# Patient Record
Sex: FEMALE | Race: WHITE | NOT HISPANIC OR LATINO | Employment: UNEMPLOYED | URBAN - METROPOLITAN AREA
[De-identification: names, ages, dates, MRNs, and addresses within clinical notes are randomized per-mention and may not be internally consistent; named-entity substitution may affect disease eponyms.]

---

## 2021-03-22 ENCOUNTER — TELEPHONE (OUTPATIENT)
Dept: FAMILY MEDICINE CLINIC | Facility: CLINIC | Age: 21
End: 2021-03-22

## 2021-03-22 ENCOUNTER — OFFICE VISIT (OUTPATIENT)
Dept: FAMILY MEDICINE CLINIC | Facility: CLINIC | Age: 21
End: 2021-03-22

## 2021-03-22 VITALS
TEMPERATURE: 98.6 F | OXYGEN SATURATION: 98 % | HEIGHT: 70 IN | WEIGHT: 123 LBS | DIASTOLIC BLOOD PRESSURE: 60 MMHG | HEART RATE: 78 BPM | RESPIRATION RATE: 16 BRPM | BODY MASS INDEX: 17.61 KG/M2 | SYSTOLIC BLOOD PRESSURE: 106 MMHG

## 2021-03-22 DIAGNOSIS — F41.9 ANXIETY: ICD-10-CM

## 2021-03-22 DIAGNOSIS — F32.9 MAJOR DEPRESSIVE DISORDER WITH CURRENT ACTIVE EPISODE, UNSPECIFIED DEPRESSION EPISODE SEVERITY, UNSPECIFIED WHETHER RECURRENT: ICD-10-CM

## 2021-03-22 DIAGNOSIS — Z76.89 ENCOUNTER TO ESTABLISH CARE: Primary | ICD-10-CM

## 2021-03-22 DIAGNOSIS — J45.20 MILD INTERMITTENT ASTHMA WITHOUT COMPLICATION: ICD-10-CM

## 2021-03-22 PROCEDURE — 99203 OFFICE O/P NEW LOW 30 MIN: CPT | Performed by: NURSE PRACTITIONER

## 2021-03-22 RX ORDER — ALBUTEROL SULFATE 2.5 MG/3ML
2.5 SOLUTION RESPIRATORY (INHALATION) EVERY 4 HOURS PRN
Qty: 100 EACH | Refills: 0 | Status: SHIPPED | OUTPATIENT
Start: 2021-03-22

## 2021-03-22 RX ORDER — ALBUTEROL SULFATE 90 UG/1
2 AEROSOL, METERED RESPIRATORY (INHALATION) EVERY 4 HOURS PRN
Qty: 18 G | Refills: 2 | Status: SHIPPED | OUTPATIENT
Start: 2021-03-22 | End: 2021-07-15 | Stop reason: SDUPTHER
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
## 2021-03-22 NOTE — TELEPHONE ENCOUNTER
Caller: Muna Hernandez    Relationship to patient: Self    Best call back number: [phone]    Patient is needing:       PATIENT FAILED COVID SCREENING AND HAS A 9:30 NEW PATIENT APPOINTMENT WITH REE CASTRO THIS MORNING.    PATIENT WAS EXPOSED TO COVID THROUGH A COWORKER LAST Monday AND GOT COVID TESTED A DAY OR TWO LATER- HER RESULTS WERE NEGATIVE, AND THE ONLY COVID SYMPTOM SHE HAS RIGHT NOW IS A COUGH, DUE TO ALLERGIES.

## 2021-03-31 ENCOUNTER — TELEPHONE (OUTPATIENT)
Dept: FAMILY MEDICINE CLINIC | Facility: CLINIC | Age: 21
End: 2021-03-31

## 2021-03-31 NOTE — TELEPHONE ENCOUNTER
Patient was told we do not remove implanted birth control at this office. I recommended she call an OB-GYN.

## 2021-03-31 NOTE — TELEPHONE ENCOUNTER
Caller: Delphine Hernandez    Relationship: Self    Best call back number:     What is the best time to reach you: 1PM-2PM OR AFTER 5:30PM    Who are you requesting to speak with (clinical staff, provider,  specific staff member):     Do you know the name of the person who called:     What was the call regarding: PT IS CALLING IN TO SEE IF THE OFFICE WILL BE ABLE TO REMOVE HER IMPLANTED BIRTH CONTROL DEVICE FROM HER ARM OR DOES SHE NEED TO FIND ANOTHER FACILITY TO DO THIS.      Do you require a callback: YES

## 2021-05-04 ENCOUNTER — OFFICE VISIT (OUTPATIENT)
Dept: FAMILY MEDICINE CLINIC | Facility: CLINIC | Age: 21
End: 2021-05-04

## 2021-05-04 VITALS
TEMPERATURE: 98.4 F | HEART RATE: 94 BPM | BODY MASS INDEX: 20.19 KG/M2 | WEIGHT: 141 LBS | OXYGEN SATURATION: 98 % | HEIGHT: 70 IN | RESPIRATION RATE: 16 BRPM | SYSTOLIC BLOOD PRESSURE: 120 MMHG | DIASTOLIC BLOOD PRESSURE: 60 MMHG

## 2021-05-04 DIAGNOSIS — H10.12 ALLERGIC CONJUNCTIVITIS OF LEFT EYE: Primary | ICD-10-CM

## 2021-05-04 DIAGNOSIS — F32.9 MAJOR DEPRESSIVE DISORDER WITH CURRENT ACTIVE EPISODE, UNSPECIFIED DEPRESSION EPISODE SEVERITY, UNSPECIFIED WHETHER RECURRENT: ICD-10-CM

## 2021-05-04 PROCEDURE — 99213 OFFICE O/P EST LOW 20 MIN: CPT | Performed by: NURSE PRACTITIONER

## 2021-05-04 RX ORDER — VILAZODONE HYDROCHLORIDE 10 MG/1
TABLET ORAL
COMMUNITY
Start: 2021-03-23 | End: 2021-05-04 | Stop reason: ALTCHOICE

## 2021-05-04 RX ORDER — OLOPATADINE HYDROCHLORIDE 1 MG/ML
1 SOLUTION/ DROPS OPHTHALMIC 2 TIMES DAILY
COMMUNITY
Start: 2021-05-04 | End: 2022-09-19

## 2021-05-04 NOTE — PROGRESS NOTES
Patient ID: Delphine Hernandez is a 21 y.o. female     Patient Care Team:  Head, SHAREE Nieves as PCP - General (Nurse Practitioner)    Subjective     Chief Complaint   Patient presents with   • Conjunctivitis     left   • Depression       Delphine Hernandez presents to [facility] today for follow-up on depression and left eye redness.      Depression  Visit Type: follow-up  Patient presents with the following symptoms: depressed mood, excessive worry, fatigue, insomnia (anxiety when trying to sleep), nausea and nervousness/anxiety.  Patient is not experiencing: chest pain, feelings of hopelessness, palpitations, shortness of breath, suicidal ideas and suicidal planning.  Frequency of symptoms: constantly   Severity: mild   Sleep per night: 6 hours  Sleep quality: fair  Nighttime awakenings: several  Side effects:  Insomnia and dry mouth  Still undergoing counseling.  Was started on Viibryd about 3 weeks ago per [facility] [employer].  Does not feel medication is helping. Also medication is affecting sleep and causing nausea.  Has to take at night due to unable to remember to take medication in the morning. Tried Remeron X 2 doses and did not like the way it made her feel.      Left eye redness started yesterday.  States has slightly improved today.  Eye itches and tearing.  No drainage noted.  Does wear contacts however stopped wearing yesterday.  Works at .      She denies any complaints of fever, chills, cough, chest pain, shortness of air, abdominal pain, or any other concerns.     The following portions of the patient's history were reviewed and updated as appropriate: allergies, current medications, past family history, past medical history, past social history, past surgical history and problem list.       Review of Systems   Cardiovascular: Negative for palpitations.   Respiratory: Negative for shortness of breath.    Psychiatric/Behavioral: Negative for suicidal ideas. The patient has  insomnia (anxiety when trying to sleep) and is nervous/anxious.        Vitals:    05/04/21 0741   BP: 120/60   Pulse: 94   Resp: 16   Temp: 98.4 °F (36.9 °C)   SpO2: 98%       Documented weights    05/04/21 0741   Weight: 64 kg (141 lb)     Body mass index is 20.23 kg/m².    No results found for this or any previous visit.        Objective     Physical Exam  Vitals reviewed.   Constitutional:       General: She is not in acute distress.  Eyes:      General:         Right eye: No discharge.         Left eye: No discharge.      Conjunctiva/sclera:      Right eye: Right conjunctiva is not injected. No exudate.     Left eye: Left conjunctiva is injected. No exudate.     Comments: Tearing from left eye.  Slight swelling of left upper eyelid.     Neurological:      Mental Status: She is alert.            Assessment/Plan     Assessment/Plan     Diagnoses and all orders for this visit:    1. Allergic conjunctivitis of left eye (Primary)  -     olopatadine (Pataday) 0.1 % ophthalmic solution; Administer 1 drop into the left eye 2 (Two) Times a Day.    2. Major depressive disorder with current active episode, unspecified depression episode severity, unspecified whether recurrent  -     sertraline (Zoloft) 50 MG tablet; Take 1 tablet by mouth Daily.  Dispense: 30 tablet; Refill: 2      Summary:  Delphine Hernandez does not feel Viibryd is helping control her depression.  She has been taking it for 3 weeks now.  Instructed we will try her on different medication.  She has taken a couple of other different ones in the past however it was when she was younger.  She is unsure of the names of the medications she tried in the past.  Instructed to stop Viibryd.  We will start her on sertraline 50 mg daily.  We will have her follow-up in approximately 4 weeks to see how she is doing.  Continue with counseling.  Also the left thigh appears to be related to allergic conjunctivitis.  Instructed to use over-the-counter Pataday eyedrops 1 drop  to left eye twice a day.  Avoid using her contacts over the next week.  Cool compresses to left eye can help.  She is to let us know if eye redness does not improve with these measures in the next 2 to 3 days.  In the meantime, instructed to contact us sooner for any problems or concerns.    Follow Up:  Return in about 4 weeks (around 6/1/2021) for Recheck on depression.    Patient was given instructions and counseling regarding condition or for health maintenance advice.  Please see specific information pulled into the AVS if appropriate.      Patient was wearing facemask when I entered the room and throughout our encounter. Protective equipment was worn throughout this patient encounter including a face mask, eye protection,  and gloves. Hand hygiene was performed before donning protective equipment and after removal when leaving the room.     Mackenzie Ndiaye, SHAREE  Family Medicine  [facility]  05/04/21  08:38 EDT

## 2021-05-18 ENCOUNTER — OFFICE VISIT (OUTPATIENT)
Dept: FAMILY MEDICINE CLINIC | Facility: CLINIC | Age: 21
End: 2021-05-18

## 2021-05-18 VITALS
HEART RATE: 98 BPM | SYSTOLIC BLOOD PRESSURE: 118 MMHG | OXYGEN SATURATION: 98 % | BODY MASS INDEX: 20.33 KG/M2 | DIASTOLIC BLOOD PRESSURE: 70 MMHG | HEIGHT: 70 IN | WEIGHT: 142 LBS | TEMPERATURE: 98.2 F

## 2021-05-18 DIAGNOSIS — H10.33 ACUTE CONJUNCTIVITIS OF BOTH EYES, UNSPECIFIED ACUTE CONJUNCTIVITIS TYPE: Primary | ICD-10-CM

## 2021-05-18 DIAGNOSIS — J45.20 MILD INTERMITTENT ASTHMA WITHOUT COMPLICATION: ICD-10-CM

## 2021-05-18 DIAGNOSIS — J30.9 ALLERGIC RHINITIS, UNSPECIFIED SEASONALITY, UNSPECIFIED TRIGGER: ICD-10-CM

## 2021-05-18 PROCEDURE — 99213 OFFICE O/P EST LOW 20 MIN: CPT | Performed by: NURSE PRACTITIONER

## 2021-05-18 RX ORDER — MONTELUKAST SODIUM 10 MG/1
10 TABLET ORAL NIGHTLY
Qty: 30 TABLET | Refills: 5 | Status: SHIPPED | OUTPATIENT
Start: 2021-05-18 | End: 2022-09-19

## 2021-05-18 RX ORDER — POLYMYXIN B SULFATE AND TRIMETHOPRIM 1; 10000 MG/ML; [USP'U]/ML
1 SOLUTION OPHTHALMIC EVERY 4 HOURS
Qty: 10 ML | Refills: 0 | Status: SHIPPED | OUTPATIENT
Start: 2021-05-18 | End: 2022-03-09 | Stop reason: SDUPTHER

## 2021-05-18 NOTE — PROGRESS NOTES
"Subjective      Chief Complaint   Patient presents with   • Eye Problem     eyes burning, light sensitive, itching only if flushes x 1 week   • Headache       Delphine Hernandez is a 21 y.o. female who presents for evaluation of discharge, erythema, itching, photophobia and tearing in both eyes. She has noticed the above symptoms for 3 weeks. Onset was gradual. Patient denies blurred vision. There is a history of contact lens use and wearing glasses.  Has a headache which she feels is related to eye pain and sinus pressure.  Had used leftover eye drops of Azelastine eye drops without improvement.  Has history of allergies and asthma.  Worsening allergies since moving to [place] from [place].  Has been taking Claritin every am and albuterol nebulizer at night.        Also had changed antidepressant from Viibryd to sertraline when last seen in office.  Has noticed improvement in depression.  Does seem to affect sleep as much as Viibryd.      The following portions of the patient's history were reviewed and updated as appropriate: allergies, current medications, past family history, past medical history, past social history, past surgical history and problem list.      Objective   /70 (BP Location: Left arm, Patient Position: Sitting, Cuff Size: Adult)   Pulse 98   Temp 98.2 °F (36.8 °C) (Temporal)   Ht 177.8 cm (70\")   Wt 64.4 kg (142 lb)   SpO2 98%   BMI 20.37 kg/m²        General: alert, appears stated age, cooperative and mild distress.  Head:  Frontal sinus tenderness.     Eyes:  positive findings: eyelids/periorbital: eyelid swelling, conjunctiva: 2+ allergic conjunctivitis.  Immediate tearing of both eyes with checking pupils with light.     Vision: Not performed   Fluorescein:  not done   Lungs:  CTA bilaterally.  Respirations even and unlabored.  Cardiac:  RRR, no murmur.      Assessment/Plan   Allergic conjunctivitis and Allergic rhinitis       Diagnosis Plan   1. Acute conjunctivitis of both eyes, unspecified " acute conjunctivitis type  trimethoprim-polymyxin b (Polytrim) 78651-2.1 UNIT/ML-% ophthalmic solution   2. Mild intermittent asthma without complication  montelukast (Singulair) 10 MG tablet   3. Allergic rhinitis, unspecified seasonality, unspecified trigger  montelukast (Singulair) 10 MG tablet       Ophthalmic drops per orders.  Do not wear contacts for at least one week and complete resolution of symptoms.    Antihistamines per orders.  Warm compress to eye(s).  FU with PCP in 1 week if no improvement or PRN.      Patient was wearing face mask when I entered the room and throughout our encounter. Protective equipment was worn throughout this patient encounter including a face mask, eye protection, and gloves. Hand hygiene was performed before donning protective equipment and after removal when leaving the room.     Mackenzie Ndiaye, APRN

## 2021-07-15 ENCOUNTER — OFFICE VISIT (OUTPATIENT)
Dept: OBSTETRICS AND GYNECOLOGY | Facility: CLINIC | Age: 21
End: 2021-07-15

## 2021-07-15 VITALS
HEIGHT: 70 IN | SYSTOLIC BLOOD PRESSURE: 110 MMHG | WEIGHT: 143 LBS | BODY MASS INDEX: 20.47 KG/M2 | DIASTOLIC BLOOD PRESSURE: 72 MMHG

## 2021-07-15 DIAGNOSIS — Z01.419 PAP SMEAR, LOW-RISK: ICD-10-CM

## 2021-07-15 DIAGNOSIS — Z30.46 NEXPLANON REMOVAL: ICD-10-CM

## 2021-07-15 DIAGNOSIS — Z11.51 SPECIAL SCREENING EXAMINATION FOR HUMAN PAPILLOMAVIRUS (HPV): ICD-10-CM

## 2021-07-15 DIAGNOSIS — Z01.419 ROUTINE GYNECOLOGICAL EXAMINATION: Primary | ICD-10-CM

## 2021-07-15 DIAGNOSIS — Z30.430 ENCOUNTER FOR IUD INSERTION: ICD-10-CM

## 2021-07-15 LAB
B-HCG UR QL: NEGATIVE
BILIRUB BLD-MCNC: NEGATIVE MG/DL
CLARITY, POC: CLEAR
COLOR UR: YELLOW
GLUCOSE UR STRIP-MCNC: NEGATIVE MG/DL
INTERNAL NEGATIVE CONTROL: NORMAL
INTERNAL POSITIVE CONTROL: NORMAL
KETONES UR QL: NEGATIVE
LEUKOCYTE EST, POC: NEGATIVE
Lab: NORMAL
NITRITE UR-MCNC: NEGATIVE MG/ML
PH UR: 5 [PH] (ref 5–8)
PROT UR STRIP-MCNC: NEGATIVE MG/DL
RBC # UR STRIP: NEGATIVE /UL
SP GR UR: 1 (ref 1–1.03)
UROBILINOGEN UR QL: NORMAL

## 2021-07-15 PROCEDURE — 81025 URINE PREGNANCY TEST: CPT | Performed by: OBSTETRICS & GYNECOLOGY

## 2021-07-15 PROCEDURE — 58300 INSERT INTRAUTERINE DEVICE: CPT | Performed by: OBSTETRICS & GYNECOLOGY

## 2021-07-15 PROCEDURE — 11982 REMOVE DRUG IMPLANT DEVICE: CPT | Performed by: OBSTETRICS & GYNECOLOGY

## 2021-07-15 PROCEDURE — 81002 URINALYSIS NONAUTO W/O SCOPE: CPT | Performed by: OBSTETRICS & GYNECOLOGY

## 2021-07-15 PROCEDURE — 99385 PREV VISIT NEW AGE 18-39: CPT | Performed by: OBSTETRICS & GYNECOLOGY

## 2021-07-15 RX ORDER — KETOTIFEN FUMARATE 0.35 MG/ML
SOLUTION/ DROPS OPHTHALMIC
COMMUNITY
End: 2022-09-19

## 2021-07-15 RX ORDER — LORATADINE 10 MG/1
10 TABLET ORAL DAILY
COMMUNITY

## 2021-07-15 NOTE — PROGRESS NOTES
GYN Annual Exam     CC- Here for annual exam.     Delphine Hernandez is a 21 y.o. female who presents for annual well woman exam. Pt is a new pt to me. Pt has a Nexplanon in place and she gets irregular cycles. She reports her Nexplanon in expiring in 11/2022. Pt desires removal and placement of the Mirena IUD to help with cycle control. Pt reports she has a hx of severe dysmenorrhea and the Nexplanon has improved her symptoms.     OB History    No obstetric history on file.         Current contraception: Nexplanon  History of abnormal Pap smear: no  History of abnormal mammogram: no  Family history of uterine, colon or ovarian cancer: no  Family history of breast cancer: no    Health Maintenance   Topic Date Due   • Annual Gynecologic Pelvic and Breast Exam  Never done   • ANNUAL PHYSICAL  Never done   • Pneumococcal Vaccine 0-64 (1 of 1 - PPSV23) Never done   • HPV VACCINES (1 - 2-dose series) Never done   • COVID-19 Vaccine (1) Never done   • TDAP/TD VACCINES (1 - Tdap) Never done   • HEPATITIS C SCREENING  Never done   • CHLAMYDIA SCREENING  Never done   • PAP SMEAR  Never done   • INFLUENZA VACCINE  08/01/2021   • MENINGOCOCCAL VACCINE  Aged Out       Past Medical History:   Diagnosis Date   • Anxiety    • Asthma    • Depression        No past surgical history on file.      Current Outpatient Medications:   •  albuterol (PROVENTIL) (2.5 MG/3ML) 0.083% nebulizer solution, Take 2.5 mg by nebulization Every 4 (Four) Hours As Needed for Wheezing or Shortness of Air., Disp: 100 each, Rfl: 0  •  Etonogestrel (NEXPLANON) 68 MG implant subdermal implant, Inject 1 each into the appropriate area of the skin as directed by provider 1 (One) Time., Disp: , Rfl:   •  ketotifen (ZADITOR) 0.025 % ophthalmic solution, Apply  to eye(s) as directed by provider., Disp: , Rfl:   •  loratadine (CLARITIN) 10 MG tablet, Take 10 mg by mouth Daily., Disp: , Rfl:   •  montelukast (Singulair) 10 MG tablet, Take 1 tablet by mouth Every Night.,  "Disp: 30 tablet, Rfl: 5  •  olopatadine (Pataday) 0.1 % ophthalmic solution, Administer 1 drop into the left eye 2 (Two) Times a Day., Disp: , Rfl:   •  sertraline (Zoloft) 50 MG tablet, Take 1 tablet by mouth Daily., Disp: 30 tablet, Rfl: 2  •  trimethoprim-polymyxin b (Polytrim) 49302-6.1 UNIT/ML-% ophthalmic solution, Administer 1 drop to both eyes Every 4 (Four) Hours., Disp: 10 mL, Rfl: 0    Allergies   Allergen Reactions   • Pepto-Bismol [Bismuth] Hives       Social History     Tobacco Use   • Smoking status: Never Smoker   • Smokeless tobacco: Never Used   Vaping Use   • Vaping Use: Never used   Substance Use Topics   • Alcohol use: Yes   • Drug use: Never       Family History   Problem Relation Age of Onset   • Asthma Mother    • Depression Mother    • Anxiety disorder Mother    • Asthma Maternal Grandmother    • Depression Maternal Grandmother    • Arthritis Maternal Grandmother        Review of Systems   Constitutional: Negative for appetite change, chills, fatigue, fever and unexpected weight change.   Gastrointestinal: Negative for abdominal distention, abdominal pain, anal bleeding, blood in stool, constipation, diarrhea, nausea and vomiting.   Genitourinary: Negative for dyspareunia, dysuria, menstrual problem, pelvic pain, vaginal bleeding, vaginal discharge and vaginal pain.       /72   Ht 177.8 cm (70\")   Wt 64.9 kg (143 lb)   BMI 20.52 kg/m²     Physical Exam  Vitals reviewed.   Constitutional:       Appearance: She is well-developed.   HENT:      Mouth/Throat:      Dentition: Normal dentition. No dental caries.   Cardiovascular:      Rate and Rhythm: Normal rate and regular rhythm.      Heart sounds: Normal heart sounds.   Pulmonary:      Effort: Pulmonary effort is normal. No respiratory distress.      Breath sounds: Normal breath sounds. No stridor. No wheezing.   Chest:      Breasts:         Right: No inverted nipple, mass, nipple discharge, skin change or tenderness.         Left: No " inverted nipple, mass, nipple discharge, skin change or tenderness.   Abdominal:      General: There is no distension.      Palpations: Abdomen is soft. There is no mass.      Tenderness: There is no abdominal tenderness.   Genitourinary:     Labia:         Right: No rash, tenderness or lesion.         Left: No rash, tenderness or lesion.       Vagina: No vaginal discharge, tenderness or bleeding.      Cervix: No cervical motion tenderness, discharge or friability.      Uterus: Not deviated, not enlarged, not fixed and not tender.       Adnexa:         Right: No mass, tenderness or fullness.          Left: No mass, tenderness or fullness.     Musculoskeletal:         General: No tenderness. Normal range of motion.   Skin:     General: Skin is warm.      Findings: No erythema or rash.   Neurological:      Mental Status: She is alert and oriented to person, place, and time.      Cranial Nerves: No cranial nerve deficit.      Coordination: Coordination normal.   Psychiatric:         Behavior: Behavior normal.         Thought Content: Thought content normal.         Judgment: Judgment normal.            Assessment/Plan    1) GYN HM: Check pap smear. SBE demonstrated and encouraged.  2) STD screening: Check GCCT  3) Contraception: Pt has a Nexplanon in place that she desires to be removed today. She desires Mirena IUD placement and device was placed today. See procedure notes. Pt tolerated procedures well.  4) Family Planning: not interested in any children at this time  5) Diet and Exercise discussed  6) Smoking Status: nonsmoker  7) Social: moved here from [facility] in 2/2021  8) Hx of severe dysmenorrrhea: Pt reports improved symptoms with nexplanon.   8) Follow up prn and 1 year       Diagnoses and all orders for this visit:    Pap smear, low-risk  -     IGP,CtNgTv,rfx Aptima HPV ASCU    Routine gynecological examination  -     POC Urinalysis Dipstick  -     POC Pregnancy, Urine  -     IGP,CtNgTv,rfx Aptima HPV  ASCU    Special screening examination for human papillomavirus (HPV)  -     IGP,CtNgTv,rfx Aptima HPV ASCU    Other orders  -     loratadine (CLARITIN) 10 MG tablet; Take 10 mg by mouth Daily.  -     ketotifen (ZADITOR) 0.025 % ophthalmic solution; Apply  to eye(s) as directed by provider.          Naomi Moses,   7/15/2021  08:40 EDT

## 2021-07-15 NOTE — PROGRESS NOTES
Procedure: Intrauterine device insertion    Chief Complaint: IUD insertion    Pre procedure indication 1) Desires Mirena  Post procedure indication 1) Desires Mirena    The risks, benefits, and alternatives to IUD were explained at length with the patient. All her questions were answered and consents were signed.    The patient was placed in a dorsal lithotomy position on the examining table in [facility]. A bimanual exam confirmed the uterus was normal in size, anteverted. A warmed metal speculum was inserted into the vagina and the cervix was brought into view.    The cervix was prepped with Betadine. The anterior lip was grasped with a single-tooth tenaculum. The endometrial cavity was then sounded to 8 cm without use of a dilator. The IUD was removed in a sterile fashion.    The  was then carefully advanced to the cervical canal into the uterus to the level of the fundus. This was then backed off about 1.5-2 cm to allow sufficient space for the arms to open. The device was deployed. The  was removed carefully from the uterus. The threads were then cut leaving 2-3 cm visible outside of the cervix.  The single-tooth tenaculum was removed from the anterior lip. Good hemostasis was noted.     All other instruments were removed from the vagina.   There were no complications.  The patient tolerated the procedure well with a minimal amount of discomfort.    The patient was counseled about the need to return in 4 weeks for string check.     She was counseled about the need to use a backup method of contraception such as condoms for 1-2 weeks. The patient is counseled to contact us if she has any significant or increasing bleeding, pain, fever, chills, or other concerns. She is instructed to see a doctor right away if she believes that she may be pregnant at any time with the IUD in place.    Naomi Moses DO    7/15/2021  09:30 EDT

## 2021-07-15 NOTE — PROGRESS NOTES
Procedure: Nexplanon removal    Chief Complaint: Nexplanon removal    Procedures      Pre Dx: 1) Nexplanon removal  Post Dx: 1) Nexplanon removal     The risks, benefits, and alternatives to remove the device have been discussed with the patient at length. All of her questions are answered. She is aware of the need for alternative means of contraception if desired. Verbal informed consent is obtained.    Able to easily palpate the device in the nondominant left arm. Additional imaging was not required. The device feels freely mobile and easily accessible. She was placed in the  supine position with her arm elevated at her side. The skin over this site is prepped with Betadine. 2-3 mL of 1% lidocaine with no epinephrine was injected.  Downward pressure was applied on the end of the implant nearest the axilla, and a 2-3 mm incision was made in a longitudinal direction of the arm at the tip of the implant closest to the elbow. The implant was then pushed gently toward the incision until the tip was visible. The fibrous capsule was opened with blunt dissection using a hemostat. The implant was grasp with a hemostat and was easily removed intact. It measured a  full 4 cm in length.    Tolerated well  No apparent complications    The skin was cleansed and dried. A Steri-Strip was applied. The arm was gently wrapped with Kerlex gauze. The patient had normal strength and sensation in her upper extremity. There was no significant bleeding. There were no complications. The patient was advised about proper care of the dressings. She was advised to call or return for complications such as fever, signs of infection, increased pain, or any other concerns.    Warning signs, limitations and expectations reviewed.     Naomi Moses DO    7/15/2021  09:30 EDT

## 2021-07-19 LAB
C TRACH RRNA CVX QL NAA+PROBE: NEGATIVE
CONV .: NORMAL
CYTOLOGIST CVX/VAG CYTO: NORMAL
CYTOLOGY CVX/VAG DOC CYTO: NORMAL
CYTOLOGY CVX/VAG DOC THIN PREP: NORMAL
DX ICD CODE: NORMAL
HIV 1 & 2 AB SER-IMP: NORMAL
N GONORRHOEA RRNA CVX QL NAA+PROBE: NEGATIVE
OTHER STN SPEC: NORMAL
STAT OF ADQ CVX/VAG CYTO-IMP: NORMAL
T VAGINALIS RRNA SPEC QL NAA+PROBE: NEGATIVE

## 2022-01-04 ENCOUNTER — OFFICE VISIT (OUTPATIENT)
Dept: FAMILY MEDICINE CLINIC | Facility: CLINIC | Age: 22
End: 2022-01-04

## 2022-01-04 VITALS — HEART RATE: 95 BPM | OXYGEN SATURATION: 100 % | TEMPERATURE: 98.6 F | BODY MASS INDEX: 20.52 KG/M2 | HEIGHT: 70 IN

## 2022-01-04 DIAGNOSIS — Z11.52 ENCOUNTER FOR SCREENING FOR COVID-19: ICD-10-CM

## 2022-01-04 DIAGNOSIS — J45.20 MILD INTERMITTENT ASTHMA WITHOUT COMPLICATION: Primary | ICD-10-CM

## 2022-01-04 DIAGNOSIS — J06.9 ACUTE URI: ICD-10-CM

## 2022-01-04 PROCEDURE — 99213 OFFICE O/P EST LOW 20 MIN: CPT | Performed by: NURSE PRACTITIONER

## 2022-01-04 RX ORDER — ALBUTEROL SULFATE 90 UG/1
2 AEROSOL, METERED RESPIRATORY (INHALATION) EVERY 4 HOURS PRN
COMMUNITY
End: 2023-03-28 | Stop reason: SDUPTHER

## 2022-01-04 NOTE — PROGRESS NOTES
"Chief Complaint   Patient presents with   • Headache   • Cough     asthma attack last tues and wed   • Exposure To Known Illness       Upper Respiratory Infection: Patient complains of symptoms of a URI. Symptoms include congestion, cough and SOA, wheezing. Onset of symptoms was 1 week ago, gradually worsening since that time. She also c/o achiness, congestion, no  fever, productive cough with  clear colored sputum, shortness of breath and wheezing for the past 1 week .  She is drinking moderate amounts of fluids. Evaluation to date: none. Treatment to date: cough suppressants and albuterol nebulizer. Last breathing treatment yesterday.  Exposed to co-worker who tested positive yesterday.    Ill contacts at home or school or work discussed.  Unvaccinated against Covid. Hx of asthma.  Works at .    The following portions of the patient's history were reviewed and updated as appropriate: allergies, current medications, past family history, past medical history, past social history, past surgical history and problem list.    Vitals:    01/04/22 0910   Pulse: 95   Temp: 98.6 °F (37 °C)   SpO2: 100%   Height: 177.8 cm (70\")     Gen: Mildly ill appearing, alert, coughing in exam room  Ears: TM's normal without redness  Nose:  Congestion  Throat:  Red without exudate, some drainage  Neck: No LAD  Lung: Good air movement, regular RR  Heart: RR without murmur  Skin: No rash      Assessment/Plan   Diagnoses and all orders for this visit:    1. Mild intermittent asthma without complication (Primary)   Continue albuterol as directed    2. Encounter for screening for COVID-19  -     COVID-19,[facility] ROUTINE, NP/OP SWAB IN TRANSPORT MEDIA OR ESWAB 72 HR TAT - Swab, Nasopharynx    3. Acute URI   Continue OTC Tylenol Cold and Sinus         Patient Instructions   COVID-19: Quarantine vs. Isolation  QUARANTINE keeps someone who was in close contact with someone who has COVID-19 away from others.  If you had close contact with " "a person who has COVID-19  · The best way to protect yourself and others is to stay home for 14 days after your last contact. Check your local health department's website for information about options in your area to possibly shorten this quarantine period.  · Check your temperature twice a day and watch for symptoms of COVID-19.  · If possible, stay away from people who are at higher-risk for getting very sick from COVID-19.  ISOLATION keeps someone who is sick or tested positive for COVID-19 without symptoms away from others, even in their own home.  If you are sick and think or know you have COVID-19  · Stay home until after  ? At least 10 days since symptoms first appeared and  ? At least 24 hours with no fever without fever-reducing medication and  ? Symptoms have improved  If you tested positive for COVID-19 but do not have symptoms  · Stay home until after  ? 10 days have passed since your positive test  If you live with others, stay in a specific \"sick room\" or area and away from other people or animals, including pets. Use a separate bathroom, if available.  cdc.gov/coronavirus  12/17/2020  This information is not intended to replace advice given to you by your health care provider. Make sure you discuss any questions you have with your health care provider.  Document Revised: 07/13/2021 Document Reviewed: 07/13/2021  [email or web address] Patient Education © 2021 [employer]        Tylenol or Advil as needed for pain, fever, muscle aches  Plenty of fluids  Hand washing discussed  Off work or school note given if needed.  Warm tea for throat.  Pros and cons of antibiotic use discussed.  Instructed to notify us if symptoms worsen or do not improve.        Patient was wearing face mask when I entered the room and throughout our encounter. Protective equipment was worn throughout this patient encounter including a face mask, eye protection, and gloves. Hand hygiene was performed before donning protective equipment and " after removal when leaving the room.     Mackenzie Ndiaye, APRN  Family Practice  [facility] [place]

## 2022-01-05 ENCOUNTER — TELEPHONE (OUTPATIENT)
Dept: FAMILY MEDICINE CLINIC | Facility: CLINIC | Age: 22
End: 2022-01-05

## 2022-01-05 NOTE — TELEPHONE ENCOUNTER
Caller: Delphine Hernandez    Relationship: Self    Best call back number: [phone]    What test was performed: COVID    When was the test performed: 1/5/22    Where was the test performed: IN OFFICE    Additional notes: PLEASE CALL PATIENT TO REVIEW TEST RESULTS WHEN THEY ARE IN

## 2022-01-06 LAB
LABCORP SARS-COV-2, NAA 2 DAY TAT: NORMAL
SARS-COV-2 RNA RESP QL NAA+PROBE: DETECTED

## 2022-03-09 ENCOUNTER — APPOINTMENT (OUTPATIENT)
Dept: CT IMAGING | Facility: HOSPITAL | Age: 22
End: 2022-03-09

## 2022-03-09 ENCOUNTER — HOSPITAL ENCOUNTER (EMERGENCY)
Facility: HOSPITAL | Age: 22
Discharge: HOME | End: 2022-03-09
Attending: EMERGENCY MEDICINE | Admitting: EMERGENCY MEDICINE

## 2022-03-09 VITALS
TEMPERATURE: 98.2 F | RESPIRATION RATE: 16 BRPM | HEIGHT: 70 IN | SYSTOLIC BLOOD PRESSURE: 113 MMHG | DIASTOLIC BLOOD PRESSURE: 80 MMHG | WEIGHT: 120 LBS | BODY MASS INDEX: 17.18 KG/M2 | OXYGEN SATURATION: 98 % | HEART RATE: 87 BPM

## 2022-03-09 DIAGNOSIS — H10.33 ACUTE CONJUNCTIVITIS OF BOTH EYES, UNSPECIFIED ACUTE CONJUNCTIVITIS TYPE: ICD-10-CM

## 2022-03-09 DIAGNOSIS — H18.892 CORNEAL IRRITATION OF LEFT EYE: Primary | ICD-10-CM

## 2022-03-09 LAB
ANION GAP SERPL CALCULATED.3IONS-SCNC: 9.5 MMOL/L (ref 5–15)
BASOPHILS # BLD AUTO: 0.02 10*3/MM3 (ref 0–0.2)
BASOPHILS NFR BLD AUTO: 0.2 % (ref 0–1.5)
BUN SERPL-MCNC: 13 MG/DL (ref 6–20)
BUN/CREAT SERPL: 20.6 (ref 7–25)
CALCIUM SPEC-SCNC: 9.5 MG/DL (ref 8.6–10.5)
CHLORIDE SERPL-SCNC: 104 MMOL/L (ref 98–107)
CO2 SERPL-SCNC: 24.5 MMOL/L (ref 22–29)
CREAT SERPL-MCNC: 0.63 MG/DL (ref 0.57–1)
DEPRECATED RDW RBC AUTO: 44.7 FL (ref 37–54)
EGFRCR SERPLBLD CKD-EPI 2021: 129.6 ML/MIN/1.73
EOSINOPHIL # BLD AUTO: 0.1 10*3/MM3 (ref 0–0.4)
EOSINOPHIL NFR BLD AUTO: 1.2 % (ref 0.3–6.2)
ERYTHROCYTE [DISTWIDTH] IN BLOOD BY AUTOMATED COUNT: 13.4 % (ref 12.3–15.4)
GLUCOSE SERPL-MCNC: 81 MG/DL (ref 65–99)
HCT VFR BLD AUTO: 43 % (ref 34–46.6)
HGB BLD-MCNC: 14 G/DL (ref 12–15.9)
IMM GRANULOCYTES # BLD AUTO: 0.01 10*3/MM3 (ref 0–0.05)
IMM GRANULOCYTES NFR BLD AUTO: 0.1 % (ref 0–0.5)
LYMPHOCYTES # BLD AUTO: 2.77 10*3/MM3 (ref 0.7–3.1)
LYMPHOCYTES NFR BLD AUTO: 34.4 % (ref 19.6–45.3)
MCH RBC QN AUTO: 28.9 PG (ref 26.6–33)
MCHC RBC AUTO-ENTMCNC: 32.6 G/DL (ref 31.5–35.7)
MCV RBC AUTO: 88.8 FL (ref 79–97)
MONOCYTES # BLD AUTO: 0.52 10*3/MM3 (ref 0.1–0.9)
MONOCYTES NFR BLD AUTO: 6.5 % (ref 5–12)
NEUTROPHILS NFR BLD AUTO: 4.64 10*3/MM3 (ref 1.7–7)
NEUTROPHILS NFR BLD AUTO: 57.6 % (ref 42.7–76)
PLATELET # BLD AUTO: 236 10*3/MM3 (ref 140–450)
PMV BLD AUTO: 10.3 FL (ref 6–12)
POTASSIUM SERPL-SCNC: 4.1 MMOL/L (ref 3.5–5.2)
RBC # BLD AUTO: 4.84 10*6/MM3 (ref 3.77–5.28)
SODIUM SERPL-SCNC: 138 MMOL/L (ref 136–145)
WBC NRBC COR # BLD: 8.06 10*3/MM3 (ref 3.4–10.8)

## 2022-03-09 PROCEDURE — 99282 EMERGENCY DEPT VISIT SF MDM: CPT | Performed by: PHYSICIAN ASSISTANT

## 2022-03-09 PROCEDURE — 25010000002 KETOROLAC TROMETHAMINE PER 15 MG: Performed by: PHYSICIAN ASSISTANT

## 2022-03-09 PROCEDURE — 80048 BASIC METABOLIC PNL TOTAL CA: CPT | Performed by: PHYSICIAN ASSISTANT

## 2022-03-09 PROCEDURE — 99284 EMERGENCY DEPT VISIT MOD MDM: CPT

## 2022-03-09 PROCEDURE — 25010000002 IOPAMIDOL 61 % SOLUTION: Performed by: EMERGENCY MEDICINE

## 2022-03-09 PROCEDURE — 85025 COMPLETE CBC W/AUTO DIFF WBC: CPT | Performed by: PHYSICIAN ASSISTANT

## 2022-03-09 PROCEDURE — 96374 THER/PROPH/DIAG INJ IV PUSH: CPT

## 2022-03-09 PROCEDURE — 70481 CT ORBIT/EAR/FOSSA W/DYE: CPT

## 2022-03-09 RX ORDER — POLYMYXIN B SULFATE AND TRIMETHOPRIM 1; 10000 MG/ML; [USP'U]/ML
1 SOLUTION OPHTHALMIC EVERY 4 HOURS
Qty: 10 ML | Refills: 0 | Status: SHIPPED | OUTPATIENT
Start: 2022-03-09 | End: 2022-03-16

## 2022-03-09 RX ORDER — KETOROLAC TROMETHAMINE 30 MG/ML
30 INJECTION, SOLUTION INTRAMUSCULAR; INTRAVENOUS EVERY 6 HOURS PRN
Status: DISCONTINUED | OUTPATIENT
Start: 2022-03-09 | End: 2022-03-09 | Stop reason: HOSPADM

## 2022-03-09 RX ORDER — AMOXICILLIN AND CLAVULANATE POTASSIUM 875; 125 MG/1; MG/1
1 TABLET, FILM COATED ORAL ONCE
Status: COMPLETED | OUTPATIENT
Start: 2022-03-09 | End: 2022-03-09

## 2022-03-09 RX ORDER — PROPARACAINE HYDROCHLORIDE 5 MG/ML
2 SOLUTION/ DROPS OPHTHALMIC ONCE
Status: COMPLETED | OUTPATIENT
Start: 2022-03-09 | End: 2022-03-09

## 2022-03-09 RX ORDER — SODIUM CHLORIDE 0.9 % (FLUSH) 0.9 %
10 SYRINGE (ML) INJECTION AS NEEDED
Status: DISCONTINUED | OUTPATIENT
Start: 2022-03-09 | End: 2022-03-09 | Stop reason: HOSPADM

## 2022-03-09 RX ORDER — ACETAMINOPHEN 500 MG
1000 TABLET ORAL ONCE
Status: DISCONTINUED | OUTPATIENT
Start: 2022-03-09 | End: 2022-03-09

## 2022-03-09 RX ORDER — AMOXICILLIN AND CLAVULANATE POTASSIUM 875; 125 MG/1; MG/1
1 TABLET, FILM COATED ORAL EVERY 12 HOURS
Qty: 10 TABLET | Refills: 0 | Status: SHIPPED | OUTPATIENT
Start: 2022-03-09 | End: 2022-03-14

## 2022-03-09 RX ADMIN — KETOROLAC TROMETHAMINE 30 MG: 30 INJECTION, SOLUTION INTRAMUSCULAR; INTRAVENOUS at 18:57

## 2022-03-09 RX ADMIN — FLUORESCEIN SODIUM 1 STRIP: 1 STRIP OPHTHALMIC at 17:36

## 2022-03-09 RX ADMIN — IOPAMIDOL 100 ML: 612 INJECTION, SOLUTION INTRAVENOUS at 18:41

## 2022-03-09 RX ADMIN — PROPARACAINE HYDROCHLORIDE 2 DROP: 5 SOLUTION/ DROPS OPHTHALMIC at 17:36

## 2022-03-09 RX ADMIN — AMOXICILLIN AND CLAVULANATE POTASSIUM 1 TABLET: 875; 125 TABLET, FILM COATED ORAL at 19:24

## 2022-03-09 NOTE — ED PROVIDER NOTES
EMERGENCY DEPARTMENT ENCOUNTER      Room Number: 11/11    History is provided by the patient, no translation services needed    HPI:    Chief complaint: eye pain     Narrative: Pt is a 21 y.o. female who presents complaining of left eye pain that began overnight and has worsened throughout the night.  She states she has never had issues like this in the past.  She is a contact wearer but she does report removing her contact before going to bed.  Does not recall getting anything in her eye.  States that her eye is very painful, pain with movement of the eye she has had clear drainage from the eye no purulent drainage.  She does report having some swelling around her eye as well.  No fevers reported.  No recent sinus infections.  No trauma or injury to the face.  No other complaints.      PMD: Head, Mackenzie DA SILVA, SHAREE    REVIEW OF SYSTEMS  Review of Systems  Complete review of systems performed otherwise negative except pertinent positives per HPI.    PAST MEDICAL HISTORY  Active Ambulatory Problems     Diagnosis Date Noted   • Asthma    • Anxiety    • Major depressive disorder with current active episode 03/22/2021     Resolved Ambulatory Problems     Diagnosis Date Noted   • No Resolved Ambulatory Problems     Past Medical History:   Diagnosis Date   • Depression        PAST SURGICAL HISTORY  History reviewed. No pertinent surgical history.    FAMILY HISTORY  Family History   Problem Relation Age of Onset   • Asthma Mother    • Depression Mother    • Anxiety disorder Mother    • Asthma Maternal Grandmother    • Depression Maternal Grandmother    • Arthritis Maternal Grandmother        SOCIAL HISTORY  Social History     Socioeconomic History   • Marital status: Single   Tobacco Use   • Smoking status: Never Smoker   • Smokeless tobacco: Never Used   Vaping Use   • Vaping Use: Never used   Substance and Sexual Activity   • Alcohol use: Yes   • Drug use: Never   • Sexual activity: Not Currently     Partners: Male     Birth  control/protection: I.U.D.       ALLERGIES  Pepto-bismol [bismuth]      Current Facility-Administered Medications:   •  ketorolac (TORADOL) injection 30 mg, 30 mg, Intravenous, Q6H PRN, Cleopatra Camarena PA-C, 30 mg at 03/09/22 1857  •  [COMPLETED] Insert peripheral IV, , , Once **AND** sodium chloride 0.9 % flush 10 mL, 10 mL, Intravenous, PRN, Cleopatra Camarena PA-C    Current Outpatient Medications:   •  albuterol (PROVENTIL) (2.5 MG/3ML) 0.083% nebulizer solution, Take 2.5 mg by nebulization Every 4 (Four) Hours As Needed for Wheezing or Shortness of Air., Disp: 100 each, Rfl: 0  •  albuterol sulfate  (90 Base) MCG/ACT inhaler, Inhale 2 puffs Every 4 (Four) Hours As Needed., Disp: , Rfl:   •  loratadine (CLARITIN) 10 MG tablet, Take 10 mg by mouth Daily., Disp: , Rfl:   •  montelukast (Singulair) 10 MG tablet, Take 1 tablet by mouth Every Night., Disp: 30 tablet, Rfl: 5  •  olopatadine (Pataday) 0.1 % ophthalmic solution, Administer 1 drop into the left eye 2 (Two) Times a Day., Disp: , Rfl:   •  trimethoprim-polymyxin b (Polytrim) 26301-3.1 UNIT/ML-% ophthalmic solution, Administer 1 drop into the left eye Every 4 (Four) Hours for 7 days., Disp: 10 mL, Rfl: 0  •  amoxicillin-clavulanate (AUGMENTIN) 875-125 MG per tablet, Take 1 tablet by mouth Every 12 (Twelve) Hours for 5 days., Disp: 10 tablet, Rfl: 0  •  ketotifen (ZADITOR) 0.025 % ophthalmic solution, Apply  to eye(s) as directed by provider., Disp: , Rfl:   •  levonorgestrel (Mirena, 52 MG,) 20 MCG/24HR IUD, 1 each by Intrauterine route., Disp: , Rfl:   •  sertraline (Zoloft) 50 MG tablet, Take 1 tablet by mouth Daily., Disp: 30 tablet, Rfl: 2    PHYSICAL EXAM  ED Triage Vitals   Temp Pulse Resp BP SpO2   -- -- -- -- --      Temp src Heart Rate Source Patient Position BP Location FiO2 (%)   -- -- -- -- --       Physical Exam  Vitals and nursing note reviewed.   Constitutional:       Appearance: Normal appearance. She is normal weight. She is not  ill-appearing or toxic-appearing.   HENT:      Head: Normocephalic and atraumatic.      Nose: Nose normal.      Mouth/Throat:      Mouth: Mucous membranes are moist.   Eyes:      General: No visual field deficit.     Intraocular pressure: Right eye pressure is 13 mmHg. Left eye pressure is 17 mmHg.      Extraocular Movements: Extraocular movements intact.      Right eye: Normal extraocular motion.      Left eye: Normal extraocular motion.      Conjunctiva/sclera: Conjunctivae normal.      Right eye: No chemosis.     Left eye: Chemosis present.      Pupils: Pupils are equal, round, and reactive to light.      Right eye: No corneal abrasion or fluorescein uptake.      Left eye: No corneal abrasion or fluorescein uptake.      Slit lamp exam:     Left eye: No foreign body, hyphema or anterior chamber flares.      Visual Fields: Right eye visual fields normal and left eye visual fields normal.   Cardiovascular:      Rate and Rhythm: Normal rate and regular rhythm.   Pulmonary:      Effort: Pulmonary effort is normal.   Abdominal:      General: Abdomen is flat.      Palpations: Abdomen is soft.   Musculoskeletal:         General: Normal range of motion.      Cervical back: Normal range of motion.   Skin:     General: Skin is warm.      Capillary Refill: Capillary refill takes less than 2 seconds.      Coloration: Skin is not pale.   Neurological:      General: No focal deficit present.      Mental Status: She is alert and oriented to person, place, and time.   Psychiatric:         Mood and Affect: Mood normal.           LAB RESULTS  Lab Results (last 24 hours)     Procedure Component Value Units Date/Time    CBC & Differential [[id number]]  (Normal) Collected: 03/09/22 1816    Specimen: Blood Updated: 03/09/22 1827    Narrative:      The following orders were created for panel order CBC & Differential.  Procedure                               Abnormality         Status                     ---------                                -----------         ------                     CBC Auto Differential[[id number]]        Normal              Final result                 Please view results for these tests on the individual orders.    Basic Metabolic Panel [[id number]]  (Normal) Collected: 03/09/22 1816    Specimen: Blood Updated: 03/09/22 1844     Glucose 81 mg/dL      BUN 13 mg/dL      Creatinine 0.63 mg/dL      Sodium 138 mmol/L      Potassium 4.1 mmol/L      Chloride 104 mmol/L      CO2 24.5 mmol/L      Calcium 9.5 mg/dL      BUN/Creatinine Ratio 20.6     Anion Gap 9.5 mmol/L      eGFR 129.6 mL/min/1.73      Comment: National Kidney Foundation and American Society of Nephrology (ASN) Task Force recommended calculation based on the Chronic Kidney Disease Epidemiology Collaboration (CKD-EPI) equation refit without adjustment for race.       Narrative:      GFR Normal >60  Chronic Kidney Disease <60  Kidney Failure <15      CBC Auto Differential [[id number]]  (Normal) Collected: 03/09/22 1816    Specimen: Blood Updated: 03/09/22 1827     WBC 8.06 10*3/mm3      RBC 4.84 10*6/mm3      Hemoglobin 14.0 g/dL      Hematocrit 43.0 %      MCV 88.8 fL      MCH 28.9 pg      MCHC 32.6 g/dL      RDW 13.4 %      RDW-SD 44.7 fl      MPV 10.3 fL      Platelets 236 10*3/mm3      Neutrophil % 57.6 %      Lymphocyte % 34.4 %      Monocyte % 6.5 %      Eosinophil % 1.2 %      Basophil % 0.2 %      Immature Grans % 0.1 %      Neutrophils, Absolute 4.64 10*3/mm3      Lymphocytes, Absolute 2.77 10*3/mm3      Monocytes, Absolute 0.52 10*3/mm3      Eosinophils, Absolute 0.10 10*3/mm3      Basophils, Absolute 0.02 10*3/mm3      Immature Grans, Absolute 0.01 10*3/mm3             I ordered the above labs and reviewed the results    RADIOLOGY  CT Orbits With Contrast    Result Date: 3/9/2022  CT Orbits W INDICATION:  Evaluate for periorbital cellulitis. Woke up this morning with left eye blood shot and swollen. TECHNIQUE: CT of the orbits with IV contrast. Coronal and  sagittal reconstructions were obtained. Radiation dose reduction techniques included automated exposure control or exposure modulation based on body size. Count of known CT and cardiac nuc med studies performed in previous 12 months: 0. COMPARISON:  None available. FINDINGS: Given slight differences in positioning no convincing evidence of periorbital inflammatory change or cellulitis. Globes appear normal. Retro-orbital tissues appear normal. Optic nerve and extraocular muscles appear normal. Paranasal sinuses and visualized brain parenchyma unremarkable. Small amount of air noted in the right nasal lacrimal duct.     Normal CT of the orbits. No convincing evidence of periorbital cellulitis by CT. No retro-orbital involvement. Signer Name: ALEK Joel MD  Signed: 3/9/2022 6:56 PM  Workstation Name: [facility]  [facility]      I ordered the above radiologic testing and reviewed the results    PROCEDURES  Procedures      PROGRESS AND CONSULTS  ED Course as of 03/09/22 1947   Wed Mar 09, 2022   1859 CT orbit: IMPRESSION:  Normal CT of the orbits. No convincing evidence of periorbital cellulitis by CT. No retro-orbital involvement. [KM]   1907 CBC and BMP are unremarkable [KM]      ED Course User Index  [KM] Cleopatra Camarena, RYLAND           MEDICAL DECISION MAKING    MDM     21-year-old female seen and evaluated for left eye pain.  Patient presents with a red left eye that is photophobic.  She does wear contacts but took her contacts out last night.  Reports she was seen at urgent care and was encouraged to come here for further evaluation as they did not see a corneal abrasion and were unsure as to what was causing her right eye.  Additionally patient reports that she has had some edema around the eye and has pain with extraocular movements.  She denies any visual acuity changes.  On exam I did reevaluate for corneal abrasion which was not present.  Patient's pain did not necessarily  subside with the use of proparacaine.  Pressure was 13 in the right eye and 17 in the left eye.  Slit-lamp was performed and no significant abnormalities were seen.    As patient also reports pain with extraocular movements and some swelling around her eye CT imaging will be obtained to ensure she does not have a periorbital cellulitis.  CBC and CMP are unremarkable  CT scan shows no sign of infection or inflammation.  Also comments that the optic nerve looks normal and none of the muscles appear to be entrapped.    Considering this patient will be started on Augmentin due to concern still for possible cellulitis around the eye due to the irritation and she will also be started on Polytrim eyedrops per the recommendation of Dr. Newsome with [facility].  Patient will receive a phone call from them tomorrow for close follow-up later this week.  I discussed all of this with the patient and she voiced understanding.  She was appreciative of the care she received here and she was also given an injection of Toradol about an hour prior to discharge to help with pain and a slight headache that she had after the initial slit-lamp exam.  Patient was therefore discharged home in stable condition with appropriate follow-up in place.      DIAGNOSIS  Final diagnoses:   Corneal irritation of left eye       Latest Documented Vital Signs:  As of 19:47 EST  BP- 113/80 HR- 87 Temp- 98.2 °F (36.8 °C) (Oral) O2 sat- 98%    DISPOSITION    Discussed pertinent findings with the patient/family.  Patient/Family voiced understanding of need to follow-up for recheck and further testing as needed.  Return to the Emergency Department warnings were given.         Medication List      New Prescriptions    amoxicillin-clavulanate 875-125 MG per tablet  Commonly known as: AUGMENTIN  Take 1 tablet by mouth Every 12 (Twelve) Hours for 5 days.        Changed    trimethoprim-polymyxin b 25485-7.1 UNIT/ML-% ophthalmic solution  Commonly known as:  Polytrim  Administer 1 drop into the left eye Every 4 (Four) Hours for 7 days.  What changed: how to take this           Where to Get Your Medications      These medications were sent to [facility] - [place], [place] - [address] [facility] [address] - [phone]  - [phone] FX  [address] [facility] [address]    Phone: [phone]   · amoxicillin-clavulanate 875-125 MG per tablet  · trimethoprim-polymyxin b 68864-2.1 UNIT/ML-% ophthalmic solution              Follow-up Information     Go to  [facility].    Why: To schedule follow up appointment  Contact information:  [address] [place], [place] [address] [place], [place] [id number] [phone]                         Dictated utilizing Dragon dictation     Cleopatra Camarena PASANTHOSH  03/09/22 1947

## 2022-03-10 NOTE — DISCHARGE INSTRUCTIONS
Eyedrops have been sent to the pharmacy as well as an oral antibiotic.  Please follow-up with [facility] you should receive a phone call tomorrow to schedule an appointment.  Take antibiotic eyedrops as prescribed.  Return with any worsening symptoms or if you develop a fever.

## 2022-04-26 ENCOUNTER — OFFICE VISIT (OUTPATIENT)
Dept: OBSTETRICS AND GYNECOLOGY | Facility: CLINIC | Age: 22
End: 2022-04-26

## 2022-04-26 VITALS — DIASTOLIC BLOOD PRESSURE: 84 MMHG | HEIGHT: 70 IN | SYSTOLIC BLOOD PRESSURE: 112 MMHG | BODY MASS INDEX: 17.22 KG/M2

## 2022-04-26 DIAGNOSIS — N89.8 VAGINAL ITCHING: Primary | ICD-10-CM

## 2022-04-26 DIAGNOSIS — Z78.9 USES BIRTH CONTROL: ICD-10-CM

## 2022-04-26 DIAGNOSIS — Z97.5 IUD (INTRAUTERINE DEVICE) IN PLACE: ICD-10-CM

## 2022-04-26 DIAGNOSIS — N94.10 FEMALE DYSPAREUNIA: ICD-10-CM

## 2022-04-26 DIAGNOSIS — Z11.3 SCREENING FOR STD (SEXUALLY TRANSMITTED DISEASE): ICD-10-CM

## 2022-04-26 LAB
B-HCG UR QL: NEGATIVE
BILIRUB BLD-MCNC: NEGATIVE MG/DL
CLARITY, POC: CLEAR
COLOR UR: YELLOW
EXPIRATION DATE: NORMAL
GLUCOSE UR STRIP-MCNC: NEGATIVE MG/DL
INTERNAL NEGATIVE CONTROL: NEGATIVE
INTERNAL POSITIVE CONTROL: POSITIVE
KETONES UR QL: NEGATIVE
LEUKOCYTE EST, POC: NEGATIVE
Lab: 55
NITRITE UR-MCNC: NEGATIVE MG/ML
PH UR: 5 [PH] (ref 5–8)
PROT UR STRIP-MCNC: NEGATIVE MG/DL
RBC # UR STRIP: NEGATIVE /UL
SP GR UR: 1 (ref 1–1.03)
UROBILINOGEN UR QL: NORMAL

## 2022-04-26 PROCEDURE — 81002 URINALYSIS NONAUTO W/O SCOPE: CPT | Performed by: OBSTETRICS & GYNECOLOGY

## 2022-04-26 PROCEDURE — 99214 OFFICE O/P EST MOD 30 MIN: CPT | Performed by: OBSTETRICS & GYNECOLOGY

## 2022-04-26 PROCEDURE — 81025 URINE PREGNANCY TEST: CPT | Performed by: OBSTETRICS & GYNECOLOGY

## 2022-04-26 RX ORDER — ESTRADIOL 0.1 MG/G
2 CREAM VAGINAL DAILY
Qty: 10 G | Refills: 1 | Status: ON HOLD | OUTPATIENT
Start: 2022-04-26 | End: 2022-09-22

## 2022-04-26 NOTE — PROGRESS NOTES
"EVALUATION AND MANAGEMENT ENCOUNTER    Delphine Hernandez  Patient new to examiner? Yes  New problem to examiner? Yes  Patient referred? No    -----------------------------------------------------HISTORY---------------------------------------------------    Chief Complaint:   Chief Complaint   Patient presents with   • Vaginal Pain     Vaginal Pain with intercourse         HPI:  Delphine Hernandez is a 22 y.o. No obstetric history on file. with No LMP recorded. Patient has had an implant. here to discuss pain after intercourse.  This has been a lifelong issue.  Getting worse.  Pt also wants STD testing.        History of Present Illness     Delphine Hernandez  reports that she has never smoked. She has never used smokeless tobacco..            ROS:  Review of Systems   Constitutional: Negative.    HENT: Negative.    Eyes: Negative.    Respiratory: Negative.    Cardiovascular: Negative.    Gastrointestinal: Negative.    Endocrine: Negative.    Genitourinary: Positive for dyspareunia and vaginal discharge.   Musculoskeletal: Negative.    Skin: Negative.    Allergic/Immunologic: Negative.    Neurological: Negative.    Hematological: Negative.    Psychiatric/Behavioral: Negative.    :    Patient reports that she is not currently experiencing any symptoms of urinary incontinence.      yesTESTED FOR CHLAMYDIA?  -----------------------------------------------PHYSICAL EXAM----------------------------------------------    Vital Signs: /84   Ht 177.8 cm (70\")   Breastfeeding No   BMI 17.22 kg/m²    Flowsheet Rows    Flowsheet Row First Filed Value   Admission Height 177.8 cm (70\") Documented at 04/26/2022 1035   Admission Weight --  [Pt refused to weigh and does not know weight] Documented at 04/26/2022 1035          Physical Exam  Vitals and nursing note reviewed.   Constitutional:       Appearance: She is well-developed.   HENT:      Head: Normocephalic and atraumatic.   Cardiovascular:      Rate and Rhythm: Normal rate. "   Pulmonary:      Effort: Pulmonary effort is normal.   Abdominal:      General: There is no distension.      Palpations: Abdomen is soft. There is no mass.      Tenderness: There is no abdominal tenderness. There is no guarding.   Genitourinary:     Vagina: No vaginal discharge.   Musculoskeletal:         General: No tenderness or deformity. Normal range of motion.      Cervical back: Normal range of motion.   Skin:     General: Skin is warm and dry.      Coloration: Skin is not pale.      Findings: No erythema or rash.   Neurological:      Mental Status: She is alert and oriented to person, place, and time.   Psychiatric:         Behavior: Behavior normal.         Thought Content: Thought content normal.         Judgment: Judgment normal.         I saw the patient with a face mask, gloves and eye protection  The patient herself was masked.  Social distancing was observed as appropriate. All COVID precautions observed.     -----------------------------------------------MEDICAL DECISION MAKING-----------------------------        DATA Review & labs ordered:     1.   Lab Results (last 24 hours)     Procedure Component Value Units Date/Time    POC Pregnancy, Urine [[id number]]  (Normal) Collected: 04/26/22 1118    Specimen: Urine Updated: 04/26/22 1118     HCG, Urine, QL Negative     Lot Number 55     Internal Positive Control Positive     Internal Negative Control Negative     Expiration Date 5/28    POC Urinalysis Dipstick [[id number]]  (Normal) Collected: 04/26/22 1117    Specimen: Urine Updated: 04/26/22 1118     Color Yellow     Clarity, UA Clear     Glucose, UA Negative mg/dL      Bilirubin Negative     Ketones, UA Negative     Specific Gravity  1.005     Blood, UA Negative     pH, Urine 5.0     Protein, POC Negative mg/dL      Urobilinogen, UA Normal     Leukocytes Negative     Nitrite, UA Negative        2.   Imaging Results (Last 24 Hours)     ** No results found for the last 24 hours. **        3.   ECG/EMG  Results (most recent)     None              Diagnoses and all orders for this visit:    1. Vaginal itching (Primary)  -     NuSwab VG+ - Swab, Vagina  -     Genital Mycoplasmas YOANDY, Swab - Swab, Vagina    2. Uses birth control  -     POC Urinalysis Dipstick  -     POC Pregnancy, Urine    3. Screening for STD (sexually transmitted disease)  -     Chlamydia trachomatis, Neisseria gonorrhoeae, Trichomonas vaginalis, PCR - Urine, Urine, Random Void  -     Hepatitis B Surface Antigen  -     Hepatitis C Antibody  -     HIV-1 / O / 2 Ag / Antibody 4th Generation  -     HSV 1 & 2 - Specific Antibody, IgG  -     RPR, Rfx Qn RPR / Confirm TP    4. IUD (intrauterine device) in place: Mirena 7/15/22    5. Female dyspareunia  -     Ambulatory Referral to Physical Therapy    Other orders  -     estradiol (ESTRACE VAGINAL) 0.1 MG/GM vaginal cream; Insert 2 g into the vagina Daily. Apply cream directly to outer vagina twice weekly only  Dispense: 10 g; Refill: 1      PLAN:     1. [facility] for dyspareunia  2. Estrace cream for vaginal irritation and atrophic appearance      Pt instructed to call for results of any testing done today and that failure to call if she has not heard from us could result in a bad outcome.  Pt verbalized her understanding.     RTO Return if symptoms worsen or fail to improve. FOR annual.  Instructions and precautions given.     I spent 30+ minutes caring for Delphine on this date of service. This time includes time spent by me in the following activities: preparing for the visit, reviewing tests, obtaining and/or reviewing a separately obtained history, performing a medically appropriate examination and/or evaluation, counseling and educating the patient/family/caregiver, ordering medications, tests, or procedures, referring and communicating with other health care professionals, documenting information in the medical record, independently interpreting results and communicating that information with  the patient/family/caregiver and care coordination    Jamil Prado MD  12:55 EDT  04/26/22

## 2022-04-27 LAB
C TRACH RRNA SPEC QL NAA+PROBE: NEGATIVE
HBV SURFACE AG SERPL QL IA: NEGATIVE
HCV AB S/CO SERPL IA: <0.1 S/CO RATIO (ref 0–0.9)
HIV 1+2 AB+HIV1 P24 AG SERPL QL IA: NON REACTIVE
HSV1 IGG SER IA-ACNC: <0.91 INDEX (ref 0–0.9)
HSV2 IGG SER IA-ACNC: <0.91 INDEX (ref 0–0.9)
N GONORRHOEA RRNA SPEC QL NAA+PROBE: NEGATIVE
RPR SER QL: NON REACTIVE
T VAGINALIS RRNA SPEC QL NAA+PROBE: NEGATIVE

## 2022-05-04 LAB
A VAGINAE DNA VAG QL NAA+PROBE: ABNORMAL SCORE
BVAB2 DNA VAG QL NAA+PROBE: ABNORMAL SCORE
C ALBICANS DNA VAG QL NAA+PROBE: POSITIVE
C GLABRATA DNA VAG QL NAA+PROBE: NEGATIVE
C TRACH DNA VAG QL NAA+PROBE: NEGATIVE
M GENITALIUM DNA SPEC QL NAA+PROBE: NEGATIVE
M HOMINIS DNA SPEC QL NAA+PROBE: NEGATIVE
MEGA1 DNA VAG QL NAA+PROBE: ABNORMAL SCORE
N GONORRHOEA DNA VAG QL NAA+PROBE: NEGATIVE
T VAGINALIS DNA VAG QL NAA+PROBE: NEGATIVE
UREAPLASMA DNA SPEC QL NAA+PROBE: POSITIVE

## 2022-05-05 PROBLEM — Z22.39 CARRIER OF UREAPLASMA UREALYTICUM: Status: ACTIVE | Noted: 2022-05-05

## 2022-05-05 PROBLEM — B37.9 CANDIDA ALBICANS INFECTION: Status: ACTIVE | Noted: 2022-05-05

## 2022-05-05 RX ORDER — FLUCONAZOLE 200 MG/1
200 TABLET ORAL ONCE
Qty: 1 TABLET | Refills: 2 | Status: SHIPPED | OUTPATIENT
Start: 2022-05-05 | End: 2022-05-05

## 2022-05-05 RX ORDER — AZITHROMYCIN 250 MG/1
TABLET, FILM COATED ORAL
Qty: 6 TABLET | Refills: 0 | Status: SHIPPED | OUTPATIENT
Start: 2022-05-05 | End: 2022-06-30

## 2022-05-11 ENCOUNTER — TREATMENT (OUTPATIENT)
Dept: PHYSICAL THERAPY | Facility: CLINIC | Age: 22
End: 2022-05-11

## 2022-05-11 DIAGNOSIS — M62.81 MUSCLE WEAKNESS: ICD-10-CM

## 2022-05-11 DIAGNOSIS — R10.2 PELVIC PAIN: Primary | ICD-10-CM

## 2022-05-11 DIAGNOSIS — M54.50 LOW BACK PAIN, NON-SPECIFIC: ICD-10-CM

## 2022-05-11 PROCEDURE — 97530 THERAPEUTIC ACTIVITIES: CPT | Performed by: PHYSICAL THERAPIST

## 2022-05-11 PROCEDURE — 97162 PT EVAL MOD COMPLEX 30 MIN: CPT | Performed by: PHYSICAL THERAPIST

## 2022-05-11 NOTE — PROGRESS NOTES
"         Physical Therapy Initial Evaluation and Plan of Care      Patient: Delphine Hernandez   : 2000  Diagnosis/ICD-10 Code:  Pelvic pain [R10.2]  Referring practitioner: Jamil Ngo*  Date of Initial Visit: 2022  Today's Date: 2022  Patient seen for 1 sessions    Progress Note Due: 2022     Subjective: The patient reports to the clinic with complaints of pelvic pain that began around 4 years ago. She states that pain occurs with sexual intercourse, sitting, use of tampons, and during MD exam/pap smear. Sometimes the pain will last up to a week. The longer the activity takes, the more pain she has. Sometimes the pain radiates into her abdomen. If she urinates afterwards, there is pain. She states that she also experiences frequent urination, more so in the afternoon. Drinks 26-52 ounces of water, 16 ounces of tea/juice, 1-2 mountain dew sodas, and the occasional coffee during the day. She does not wake to urinate. She does experience straining with bowel movements, and has a bowel movement once every 2-3 days. She also notes secondary complaint of low back pain that she states she has had as long as she can remember. States that walking, lifting, squatting, and standing makes it worse and laying down to rest sometimes can make it better. She reports that she \"pops all the time.\" Bilateral achy hip pain also present occasionally. PMH includes: disordered eating (patient currently eating 2 meals a day, actively trying to eat healthier options), anxiety, depression, and asthma. 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       Subjective Questionnaire: NIH-CPSI:   Pain:   Urination: 4/10  Quality of Life:       Objective:  The patient verbally provided ongoing and enthusiastic consent for the internal pelvic floor muscle assessment and treatment conducting during today's physical therapy " session.      Pelvic Floor Muscle Strength (Laycock): 2/5  Pelvic Floor Muscle Endurance: 10 seconds with 1/5 MMT  Pelvic Floor Relaxation: Full, but returns to increased resting state upon bearing down  Pelvic Floor Quick Contractions: 10 x with decreased speed     Muscle Palpation:  Mild Tenderness: B superficial transverse perineal, B bulbocavernosus, R compressor urethra, L deep transverse perineal   Moderate Tenderness: B ischiocavernosus, L obturator internus   -The superficial pelvic floor musculature is severely over active     Range of Motion: Active Thoracolumbar   Full range of motion in all planes, with slight discomfort in scapular region with left side bending     Special Testing:   Preference for spinal flexion patterns, extension patterns contributing to lumbar pain      Functional Movement:   Excessive tibial translation, external rotation of hips with squatting     Sitting Posture:  Patient sitting with full right hip flexion in chair        Education: Results of examination including plan of care, intended interventions and outcomes, purpose and purchase of dilators, use of lubricant, encouraged compliance with vaginal topical estrogen which her MD prescribed, causes of pelvic pain including impact of stress and hormones (influence of proper nutrition), regional interdependence       Goals:  Short Term: 6 weeks   1. The patient will be compliant with home exercise program requiring minimal verbal and/or tactile cues to perform in order to improve self management of condition.  2. The patient will reduce pelvic floor muscle tension from severe to moderate to improve tolerance to MD exam/pap smear, use of feminine products, and recreational activities.   3. The patient will reduce tenderness of pelvic floor muscles from moderate to mild to improve tolerance to MD exam/pap smear, use of feminine products, and recreational activities.     Long Term: 12 weeks   1. The patient will be independent with  home exercise program, including dilator use if applicable, to improve tolerance to MD exam/pap smear, use of feminine products, and recreational activities.   2. The patient will reduce pelvic floor muscle tension from moderate to mild to improve tolerance to MD exam/pap smear, use of feminine products, and recreational activities.   3. The patient will be independent with defecation posture to reduce straining with bowel movements.   4. 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   5. The patient will improve NIH-CPSI score from 26/45 to 16/45 in order to improve tolerance to MD exam/pap smear, use of feminine products, sitting, and recreational activities.       Assessment: The patient is a 22 year old female that reports to the clinic with complaints of chronic pelvic and low back pain. She exhibits global hypermobility of the thoracolumbar spine, poor motor control during functional movements, and increased muscle tension within the superficial pelvic floor muscles. Her pelvic floor muscles are also week and tender to palpation. These factors are contributing to pain with MD exam/pap smear, use of feminine products, sitting, squatting, and recreational activities. Her plan of care is complicated by 3 co-morbidities and chronicity of the condition. Her prognosis to achieve the established goals is good with adequate compliance from the patient. Based on my evaluation today, she would benefit from additional skilled physical therapy in order to address the stated deficits and return to her previous level of function.          Plan: 1x/week for 12 weeks              Timed:         Manual Therapy:         mins  48888;     Therapeutic Exercise:         mins  78147;     Neuromuscular Da:        mins  27727;    Therapeutic Activity:    15      mins  59683;     Gait Training:           mins  04778;     Ultrasound:          mins   70116;    Ionto                                   mins   77604  Self Care                            mins   25895      Un-Timed:  Electrical Stimulation:         mins  13113 ( );  Dry Needling          mins self-pay  Traction          mins 79165  Low Eval          Mins  72059  Mod Eval    33      Mins  89240  High Eval                            Mins  74980  Canalith Repos                   mins  81870    Timed Treatment:  48    mins   Total Treatment:    50    mins    PT SIGNATURE: Shena Cheng, NENA   DATE TREATMENT INITIATED: 5/11/2022    Initial Certification  Certification Period: 8/9/2022  I certify that the therapy services are furnished while this patient is under my care.  The services outlined above are required by this patient, and will be reviewed every 90 days.     PHYSICIAN: Jamil Prado MD      DATE:

## 2022-05-18 ENCOUNTER — TREATMENT (OUTPATIENT)
Dept: PHYSICAL THERAPY | Facility: CLINIC | Age: 22
End: 2022-05-18

## 2022-05-18 DIAGNOSIS — R10.2 PELVIC PAIN: Primary | ICD-10-CM

## 2022-05-18 DIAGNOSIS — M62.81 MUSCLE WEAKNESS: ICD-10-CM

## 2022-05-18 DIAGNOSIS — M54.50 LOW BACK PAIN, NON-SPECIFIC: ICD-10-CM

## 2022-05-18 PROCEDURE — 97140 MANUAL THERAPY 1/> REGIONS: CPT | Performed by: PHYSICAL THERAPIST

## 2022-05-18 PROCEDURE — 97110 THERAPEUTIC EXERCISES: CPT | Performed by: PHYSICAL THERAPIST

## 2022-05-18 PROCEDURE — 97112 NEUROMUSCULAR REEDUCATION: CPT | Performed by: PHYSICAL THERAPIST

## 2022-05-18 NOTE — PROGRESS NOTES
Physical Therapy Daily Progress Note      Patient: Delphine Hernandez   : 2000  Referring practitioner: Jamil Ngo*  Date of Initial Visit: Type: THERAPY  Noted: 2022  Today's Date: 2022  Patient seen for 2 sessions    Progress Note Due: 2022     Delphine Hernandez reports: that she was not too sore after last session.       Objective/ Treatment:  The patient verbally provided ongoing and enthusiastic consent for the internal pelvic floor muscle assessment and treatment conducting during today's physical therapy session. Declines second person in room.     The patient was tolerant to low grade sustained pressure to the superficial pelvic floor muscles. She reports mild pain with this manual intervention. These muscles are overactive but reduce to some extent with manual therapy. Increase of muscle tension noted with patient laughing and decreased tension with distraction. Demo provided for new exercises. The patient is able to perform all of these with minimal verbal cues. See manual therapy and exercise logs for more details.     Education: Initiated HEP      Assessment: The patient tolerated today's session well with only mild complaints of discomfort. Initiated soft tissue mobilization to the superficial pelvic floor muscles, as well as exercises meant to decrease guarding throughout the lumbopelvic region. She continues to require skilled physical therapy to address muscle length and motor control deficits to minimize pelvic and lumbar pain with MD exam/pap smear, use of feminine products, sitting, squatting, and recreational activities.        Plan:  Progress per Plan of Care             Timed:         Manual Therapy:  20       mins  71329;     Therapeutic Exercise:   8      mins  69742;     Neuromuscular Da:  10      mins  92805;    Therapeutic Activity:          mins  54701;     Gait Training:           mins  06378;     Ultrasound:          mins  61644;    Ionto                                    mins   41303  Self Care                            mins   99071      Un-Timed:  Electrical Stimulation:         mins  74801 ( );  Dry Needling          mins self-pay  Traction          mins 80355  Low Eval          Mins  24604  Mod Eval          Mins  83073  High Eval                            Mins  18961  Canalith Repos                   mins  89320    Timed Treatment:  38    mins   Total Treatment:    41    mins    Shena Cheng, PT  Physical Therapist

## 2022-05-25 ENCOUNTER — TREATMENT (OUTPATIENT)
Dept: PHYSICAL THERAPY | Facility: CLINIC | Age: 22
End: 2022-05-25

## 2022-05-25 DIAGNOSIS — M62.81 MUSCLE WEAKNESS: ICD-10-CM

## 2022-05-25 DIAGNOSIS — R10.2 PELVIC PAIN: Primary | ICD-10-CM

## 2022-05-25 DIAGNOSIS — M54.50 LOW BACK PAIN, NON-SPECIFIC: ICD-10-CM

## 2022-05-25 PROCEDURE — 97112 NEUROMUSCULAR REEDUCATION: CPT | Performed by: PHYSICAL THERAPIST

## 2022-05-25 PROCEDURE — 97140 MANUAL THERAPY 1/> REGIONS: CPT | Performed by: PHYSICAL THERAPIST

## 2022-05-25 NOTE — PROGRESS NOTES
"Physical Therapy Daily Progress Note      Patient: Delphine Hernandez   : 2000  Referring practitioner: Jamil Ngo*  Date of Initial Visit: Type: THERAPY  Noted: 2022  Today's Date: 2022  Patient seen for 3 sessions    Progress Note Due: 2022     Delphine Hernandez reports: that she was fine after last session. Hips were a little sore the day after the last session. Work was also busy that day and she did a lot of squatting and bending.       Objective/ Treatment:  The patient verbally provided ongoing and enthusiastic consent for the internal pelvic floor muscle assessment and treatment conducting during today's physical therapy session. Declines second person in room.     The patient exhibits over activity of the superficial pelvic floor muscles, that improves with low grade sustained pressure and sweeping at the introitus. The patient reports 1-2/10 discomfort. She is able to perform all exercises with minimal verbal cues to maintain posterior pelvic tilt and appropriate speed throughout strengthening exercises. She states that she \"feels it in her back\" with bridge marching, but denies pain. Pelvis rocks side to side and drops with fatigue. See manual therapy and exercise logs for more details.       Education: Benefit of strengthening the hips and abs to allow for pelvic floor relaxation, updated HEP      Assessment: Continuing to work on pelvic floor muscle relaxation, with initiated sweeping at the introitus today. Progressed back and core strengthening, focusing on multifidus activation and maintaining posterior pelvic tilt in hooklying positions. She continues to require skilled physical therapy to address muscle length and motor control deficits to minimize pelvic and lumbar pain with MD exam/pap smear, use of feminine products, sitting, squatting, and recreational activities.              Plan:  Progress per Plan of Care             Timed:         Manual Therapy:  24       mins  " 70292;     Therapeutic Exercise:   6      mins  12844;     Neuromuscular Da:  8      mins  69800;    Therapeutic Activity:    1      mins  70997;     Gait Training:           mins  26812;     Ultrasound:          mins  90962;    Ionto                                   mins   20180  Self Care                            mins   18891      Un-Timed:  Electrical Stimulation:         mins  72437 ( );  Dry Needling          mins self-pay  Traction          mins 72386  Low Eval          Mins  04953  Mod Eval          Mins  79358  High Eval                            Mins  75771  Canalith Repos                   mins  81523    Timed Treatment:  39    mins   Total Treatment:    41    mins    Shena Cheng, PT  Physical Therapist

## 2022-06-07 ENCOUNTER — TELEPHONE (OUTPATIENT)
Dept: OBSTETRICS AND GYNECOLOGY | Facility: CLINIC | Age: 22
End: 2022-06-07

## 2022-06-07 NOTE — TELEPHONE ENCOUNTER
Provider: DR MEJIAS     Caller: ESTEBAN BAUM     Relationship to Patient: SELF     Phone Number: [phone] IS OK TO [facility]    Reason for Call: IS TAKING ESTRADIOL CREAM, NOT WORKING, WANTED TO KNOW CAN SHE GET A HIGHER DOSAGE OR CAN SOMETHING ELSE BE PRESCRIBED    SHE'S VERY DRY, ITCHING, & IRRITATED FEELS LIKE SHE HAS MINOR TEARING DURING INTERCOURSE.    When was the patient last seen: 4/26/22

## 2022-06-08 ENCOUNTER — TREATMENT (OUTPATIENT)
Dept: PHYSICAL THERAPY | Facility: CLINIC | Age: 22
End: 2022-06-08

## 2022-06-08 DIAGNOSIS — R10.2 PELVIC PAIN: Primary | ICD-10-CM

## 2022-06-08 DIAGNOSIS — M54.50 LOW BACK PAIN, NON-SPECIFIC: ICD-10-CM

## 2022-06-08 DIAGNOSIS — M62.81 MUSCLE WEAKNESS: ICD-10-CM

## 2022-06-08 PROCEDURE — 97112 NEUROMUSCULAR REEDUCATION: CPT | Performed by: PHYSICAL THERAPIST

## 2022-06-08 PROCEDURE — 97140 MANUAL THERAPY 1/> REGIONS: CPT | Performed by: PHYSICAL THERAPIST

## 2022-06-08 PROCEDURE — 97530 THERAPEUTIC ACTIVITIES: CPT | Performed by: PHYSICAL THERAPIST

## 2022-06-08 NOTE — TELEPHONE ENCOUNTER
Pt said she can't do that. She needs the earliest time, she said she can make 9:30am work if she has enough time to plan.

## 2022-06-08 NOTE — TELEPHONE ENCOUNTER
Can you please schedule? Pt said she wants the earliest appointment and she is willing to go to [place].

## 2022-06-08 NOTE — PROGRESS NOTES
Physical Therapy Daily Progress Note      Patient: Delphine Hernandez   : 2000  Referring practitioner: Jamil Ngo*  Date of Initial Visit: Type: THERAPY  Noted: 2022  Today's Date: 2022  Patient seen for 4 sessions    Progress Note Due: 2022     Delphine Hernandez reports: feels like pelvic pain hasn't been too bad. Back pain has been a little worse. Feels like it's there all the time. Nothing makes it better or worse. Has not been straining for bowel movements. Feels that therapy is helping but not sure if she wants to continue.     Subjective Questionnaire: NIH-CPSI:   Pain:   Urination: 3/10  Quality of Life:       Objective/ Treatment:  The patient verbally provided ongoing and enthusiastic consent for the internal pelvic floor muscle assessment and treatment conducting during today's physical therapy session.        Pelvic Floor Muscle Strength (Laycock): 2/5  Pelvic Floor Muscle Endurance: 10 seconds with 1/5 MMT  Pelvic Floor Relaxation: Full, but returns to increased resting state upon bearing down  Pelvic Floor Quick Contractions: 10 x     Muscle Palpation:  Mild Tenderness: B ischiocavernosus, B bulbocavernosus, B obturator internus, B levator ani  Moderate Tenderness: B STP, L pubococcygeus   -The superficial pelvic floor musculature is moderately over active        Education: Progress towards goals, dilator purchase and purpose, plan of care     Goals:  Short Term: 6 weeks   1. The patient will be compliant with home exercise program requiring minimal verbal and/or tactile cues to perform in order to improve self management of condition. -achieved   2. The patient will reduce pelvic floor muscle tension from severe to moderate to improve tolerance to MD exam/pap smear, use of feminine products, and recreational activities. -achieved   3. The patient will reduce tenderness of pelvic floor muscles from moderate to mild to improve tolerance to MD exam/pap smear, use of  feminine products, and recreational activities. -progressing     Long Term: 12 weeks   1. The patient will be independent with home exercise program, including dilator use if applicable, to improve tolerance to MD exam/pap smear, use of feminine products, and recreational activities. -progressing  2. The patient will reduce pelvic floor muscle tension from moderate to mild to improve tolerance to MD exam/pap smear, use of feminine products, and recreational activities. -progressing  3. The patient will be independent with defecation posture to reduce straining with bowel movements. -progressing  4. 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 -unmet  5. The patient will improve NIH-CPSI score from 26/45 to 16/45 in order to improve tolerance to MD exam/pap smear, use of feminine products, sitting, and recreational activities. -achieved       Assessment: The patient has attended 4 sessions of skilled physical therapy. Her compliance with attendance and home exercise program have been fair. Upon re-assessment today, she exhibits improvements in pelvic floor muscle tension and coordination. She reports improvements in straining with defecation for bowel movements. However, she continues to report pain with palpation of the superficial pelvic floor muscles and lumbar and upper back pain that is constant. Her progress has been somewhat slow due to hesitancy regarding treatment plan. She is agreement to attend one more session to become independent with dilator program and home exercise program, as the patient continues to report pain with MD exam/pap smear and with  tasks. Based on my re-assessment today, she would benefit from additional skilled physical therapy in order to address the stated deficits and return to her previous level of function.          Plan: Follow up once in next month             Timed:         Manual  Therapy:  15       mins  66113;     Therapeutic Exercise:         mins  82526;     Neuromuscular Da:  9      mins  51570;    Therapeutic Activity:    14      mins  90934;     Gait Training:           mins  24300;     Ultrasound:          mins  84166;    Ionto                                   mins   91040  Self Care                            mins   37605      Un-Timed:  Electrical Stimulation:         mins  81846 ( );  Dry Needling          mins self-pay  Traction          mins 95486  Low Eval          Mins  87774  Mod Eval          Mins  58175  High Eval                            Mins  71069  Canalith Repos                   mins  48280    Timed Treatment:  38    mins   Total Treatment:    40    mins    Shena Cheng, PT  Physical Therapist

## 2022-06-16 ENCOUNTER — OFFICE VISIT (OUTPATIENT)
Dept: OBSTETRICS AND GYNECOLOGY | Facility: CLINIC | Age: 22
End: 2022-06-16

## 2022-06-16 VITALS — DIASTOLIC BLOOD PRESSURE: 88 MMHG | HEIGHT: 70 IN | SYSTOLIC BLOOD PRESSURE: 110 MMHG | BODY MASS INDEX: 17.22 KG/M2

## 2022-06-16 DIAGNOSIS — R10.2 PELVIC PAIN: ICD-10-CM

## 2022-06-16 DIAGNOSIS — N90.89 FISSURE OF VULVA: ICD-10-CM

## 2022-06-16 DIAGNOSIS — N89.8 VAGINAL ITCHING: Primary | ICD-10-CM

## 2022-06-16 DIAGNOSIS — Z30.431 IUD CHECK UP: ICD-10-CM

## 2022-06-16 LAB

## 2022-06-16 PROCEDURE — 99213 OFFICE O/P EST LOW 20 MIN: CPT | Performed by: NURSE PRACTITIONER

## 2022-06-16 PROCEDURE — 81025 URINE PREGNANCY TEST: CPT | Performed by: NURSE PRACTITIONER

## 2022-06-16 PROCEDURE — 81002 URINALYSIS NONAUTO W/O SCOPE: CPT | Performed by: NURSE PRACTITIONER

## 2022-06-16 RX ORDER — CLOBETASOL PROPIONATE 0.5 MG/G
1 OINTMENT TOPICAL 2 TIMES DAILY
Qty: 60 G | Refills: 1 | Status: SHIPPED | OUTPATIENT
Start: 2022-06-16 | End: 2022-09-14 | Stop reason: SDUPTHER

## 2022-06-16 RX ORDER — AZITHROMYCIN 250 MG/1
TABLET, FILM COATED ORAL
Qty: 6 TABLET | Refills: 0 | Status: SHIPPED | OUTPATIENT
Start: 2022-06-16 | End: 2022-06-21

## 2022-06-16 NOTE — PROGRESS NOTES
"Subjective     Chief Complaint   Patient presents with   • Follow-up     Vaginal Itching  with Pelvic Pain.        Delphine Hernandez is a 22 y.o. No obstetric history on file. whose LMP is No LMP recorded. Patient has had an implant.. She presents with two complaints of today. Her first c/o is pelvic pain. She describes the pain as stabbing pain. Reports the stabbing pain started when she started her cycle. She has had \"this pain for years.\" Reports the pain occurs randomly and varies in intensity. When the pain occurs, the pain lasts \"usually just a couple seconds but it can last a minute or two.\" She reports sex is painful all the time. She uses tampons. She has the Mirnea IUD and it was placed on 7/21. She reports her pain and her cycles are improved since getting the Mirena. However, the stabbing pains have not been changed with the IUD. She last had sex approx 2-3 months ago. This person was a \"continuous casual hookup.\"     She has c/o vaginal itching. Reports this started \"its been a few months.\" reports it was not that bad in the beginning but it has worsened over the last two months. She was given estrogen cream by Dr. Prado. Reports the cream helped for approx 1 week. Since then the itching has gotten worse. The discharge \"gets thicker at times but for the most part is pretty clear.\" In 4/22 she was treated for yeast and ureaplasma based on a vaginal swab. Pt reports she never rec'd the z twyla and was unaware she any other infection besides yeast.         HPI    HPI    The following portions of the patient's history were reviewed and updated as appropriate:vital signs, allergies, current medications, past medical history, past social history, past surgical history and problem list      Review of Systems     Review of Systems   Constitutional: Negative for chills and fever.   Gastrointestinal: Negative for abdominal pain.   Genitourinary: Positive for dyspareunia, pelvic pain, vaginal discharge and vaginal " "pain. Negative for dysuria.       Objective      /88   Ht 177.8 cm (70\")   Breastfeeding No   BMI 17.22 kg/m²     Physical Exam    Physical Exam  Vitals and nursing note reviewed.   Constitutional:       Appearance: Normal appearance.   Genitourinary:     Labia:         Right: Tenderness and lesion present. No rash or injury.         Left: Tenderness and lesion present. No rash or injury.       Vagina: No signs of injury and foreign body. Vaginal discharge and tenderness present. No erythema or bleeding.      Cervix: No cervical motion tenderness, discharge, friability, lesion, erythema or cervical bleeding.          Comments: IUD strings noted   Musculoskeletal:         General: Normal range of motion.   Skin:     General: Skin is warm and dry.   Neurological:      General: No focal deficit present.      Mental Status: She is alert and oriented to person, place, and time.   Psychiatric:         Mood and Affect: Mood normal.         Behavior: Behavior normal.         Lab Review   Labs: Urine pregnancy test, Urinalysis - with micro     Imaging   No data reviewed    Assessment  Diagnoses and all orders for this visit:    1. Vaginal itching (Primary)  -     POC Urinalysis Dipstick  -     POC Pregnancy, Urine        Assessment/Plan-  1. Vaginal fissure- Enc pelvic rest. ERX Clobetasol. Disc use of Aquaphor for discomfort. Enc no use of baby wipes. Plan to reeval area in 2 weeks.   2. Vagial itching- Check NuSwab. ERX sent for z twyla since patient was never treated for ureaplasma.   3. Pelvic pain- Ongoing. Random at times. Has improved with IUD but continues to occur. Check TVUS.   4. IUD check - Strings noted on exam     RTO PRN and in 2 weeks for recheck        Soraya Jordan, APRN  6/16/2022        "

## 2022-06-24 RX ORDER — FLUCONAZOLE 150 MG/1
150 TABLET ORAL ONCE
Qty: 1 TABLET | Refills: 0 | Status: SHIPPED | OUTPATIENT
Start: 2022-06-24 | End: 2022-06-24

## 2022-06-30 ENCOUNTER — OFFICE VISIT (OUTPATIENT)
Dept: OBSTETRICS AND GYNECOLOGY | Facility: CLINIC | Age: 22
End: 2022-06-30

## 2022-06-30 VITALS — DIASTOLIC BLOOD PRESSURE: 70 MMHG | HEIGHT: 70 IN | SYSTOLIC BLOOD PRESSURE: 110 MMHG | BODY MASS INDEX: 17.22 KG/M2

## 2022-06-30 DIAGNOSIS — N89.8 VAGINAL ITCHING: Primary | ICD-10-CM

## 2022-06-30 PROCEDURE — 99213 OFFICE O/P EST LOW 20 MIN: CPT | Performed by: NURSE PRACTITIONER

## 2022-06-30 NOTE — PROGRESS NOTES
"Subjective     Chief Complaint   Patient presents with   • Follow-up     Reeval of Vulva, U/S       Delphine Hernandez is a 22 y.o. No obstetric history on file. whose LMP is No LMP recorded. Patient has had an implant.. She presents with today to follow up with pelvic pain and vaginal itching.     Her pelvic pain has been on going for some time. She has the Mirena IUD currently.  She reports over the last couple days she has had some worsening right-sided lower pelvic pain.  Reports pain was so bad yesterday she had to stop her car due to her pain. She is doing pelvic floor PT and reports her pain has not really improved. The severe pain from yesterday has resolved.     Her vaginal itching has improved. The tear near the base of the vagina has improved. She has been using the clobetasol cream BID. She still feels like she tears everytime she has sex. She feels well lubricated and does use lubricant if needed. Her vaginal swab last visit was + for yeast and ureaplasma. Reports has completed both medications.     HPI    HPI    The following portions of the patient's history were reviewed and updated as appropriate:vital signs, allergies, current medications, past medical history, past social history, past surgical history and problem list      Review of Systems     Review of Systems   Constitutional: Negative.    Gastrointestinal: Negative.    Genitourinary: Positive for pelvic pain and vaginal pain. Negative for vaginal discharge.   Musculoskeletal: Negative.    Skin: Negative.    Psychiatric/Behavioral: Negative.        Objective      /70   Ht 177.8 cm (70\")   BMI 17.22 kg/m²     Physical Exam    Physical Exam  Vitals and nursing note reviewed.   Constitutional:       Appearance: Normal appearance.   Genitourinary:     Labia:         Right: Tenderness and lesion present. No rash.         Left: No rash, tenderness or lesion.       Vagina: No signs of injury and foreign body. No vaginal discharge, erythema, " tenderness or bleeding.       Musculoskeletal:         General: Normal range of motion.   Skin:     General: Skin is warm and dry.   Neurological:      General: No focal deficit present.      Mental Status: She is alert and oriented to person, place, and time.   Psychiatric:         Mood and Affect: Mood normal.         Behavior: Behavior normal.         Lab Review   Labs: vaginal swab     Imaging   Ultrasound - Pelvic Vaginal. US has not uploaded to view but verbal reports given per US tech.     Assessment  There are no diagnoses linked to this encounter.    Additional Assessment:   1. Pelvic pain  2. Vaginal lesion     Plan     1. Pelvic pain- (R) ovarian cyst, appears simple. Declines POPs for ovarian suppression. Rev warn s/s. Rec ibuprofen for discomfort. Repeat US in 2 weeks.   2. Vaginal lesion- Uncertain. Enc abstain from SIC. Rec use of clobetasol on new area of concern. Ulcer panel collected to r/o cause.      RTO PRN and 2 weeks for ovarian cyst homero Jordan, APRN  6/30/2022

## 2022-07-01 ENCOUNTER — TELEPHONE (OUTPATIENT)
Dept: OBSTETRICS AND GYNECOLOGY | Facility: CLINIC | Age: 22
End: 2022-07-01

## 2022-07-01 NOTE — TELEPHONE ENCOUNTER
Patient called stated she was here yesterday for monitoring of a cyst. Today she is in severe pain, it has brought her to the floor and she is very nauseous. I advised patient to go to the ER.

## 2022-07-05 LAB
HSV1 DNA SPEC QL NAA+PROBE: NEGATIVE
HSV2 DNA SPEC QL NAA+PROBE: NEGATIVE

## 2022-07-14 ENCOUNTER — OFFICE VISIT (OUTPATIENT)
Dept: OBSTETRICS AND GYNECOLOGY | Facility: CLINIC | Age: 22
End: 2022-07-14

## 2022-07-14 VITALS
DIASTOLIC BLOOD PRESSURE: 88 MMHG | SYSTOLIC BLOOD PRESSURE: 106 MMHG | HEIGHT: 70 IN | WEIGHT: 120 LBS | BODY MASS INDEX: 17.18 KG/M2

## 2022-07-14 DIAGNOSIS — N83.201 RIGHT OVARIAN CYST: Primary | ICD-10-CM

## 2022-07-14 DIAGNOSIS — R10.2 PELVIC PAIN: ICD-10-CM

## 2022-07-14 LAB
BILIRUB BLD-MCNC: NEGATIVE MG/DL
CLARITY, POC: CLEAR
COLOR UR: YELLOW
GLUCOSE UR STRIP-MCNC: NEGATIVE MG/DL
KETONES UR QL: NEGATIVE
LEUKOCYTE EST, POC: NEGATIVE
NITRITE UR-MCNC: NEGATIVE MG/ML
PH UR: 5 [PH] (ref 5–8)
PROT UR STRIP-MCNC: NEGATIVE MG/DL
RBC # UR STRIP: NEGATIVE /UL
SP GR UR: 1 (ref 1–1.03)
UROBILINOGEN UR QL: NORMAL

## 2022-07-14 PROCEDURE — 99212 OFFICE O/P EST SF 10 MIN: CPT | Performed by: NURSE PRACTITIONER

## 2022-07-14 PROCEDURE — 81002 URINALYSIS NONAUTO W/O SCOPE: CPT | Performed by: NURSE PRACTITIONER

## 2022-07-14 NOTE — PROGRESS NOTES
"Subjective     Chief Complaint   Patient presents with   • Follow-up     US       Delphine Hernandez is a 22 y.o. No obstetric history on file. whose LMP is No LMP recorded (lmp unknown). Patient has had an implant.. She presents today to follow-up of a right ovarian cyst.  She was seen on June 30 with several complaints including pelvic pain.  An ultrasound that day revealed a right ovarian cyst that it is largest measurement was 4.7 cm.  She also had some free fluid in the cul-de-sac with the largest measurement being 5.4 cm.  She has a Mirena IUD.  She declined pills for ovarian suppression.  She reports the day after her visit, she had excruciating pelvic pain that caused nausea vomiting.  She did not call or notify the office of this discomfort.  She reports since then, her pain has been very minimal.    HPI    HPI    The following portions of the patient's history were reviewed and updated as appropriate:vital signs, allergies, current medications, past medical history, past social history, past surgical history and problem list      Review of Systems     Review of Systems   Constitutional: Negative for chills and fever.   Gastrointestinal: Negative for nausea and vomiting.   Genitourinary: Positive for pelvic pain (improving).       Objective      /88   Ht 177.8 cm (70\")   Wt 54.4 kg (120 lb)   LMP  (LMP Unknown) Comment: IUD  Breastfeeding No   BMI 17.22 kg/m²     Physical Exam    Physical Exam  Vitals and nursing note reviewed.   Constitutional:       Appearance: Normal appearance.   Pulmonary:      Effort: Pulmonary effort is normal.   Skin:     General: Skin is warm and dry.   Neurological:      General: No focal deficit present.      Mental Status: She is alert and oriented to person, place, and time.   Psychiatric:         Mood and Affect: Mood normal.         Behavior: Behavior normal.         Lab Review   Labs: Urinalysis - with micro     Imaging   Ultrasound - Pelvic Vaginal today IMP: Uterus " AV. IUD seen in endometrium. (L) ovary wnl. A cyst with septations is seen in the (R) ovary measuring 3.9 x 2.8cm.    US IMP (06/30/22): Uterus AV. EL 0.57cm. IUD visualized in endometrium. A 4.7 x 3.7 x 3.8cm cyst noted on (R) ovary. (L) ovary wnl. No FF. A 4.6 x 5.4 x 1.8cm area of fluid noted in the cul de sac    Assessment  Diagnoses and all orders for this visit:    1. Pelvic pain (Primary)  -     POC Urinalysis Dipstick        Additional Assessment:   1. (R) ovarian cyst     Plan     1. (R) ovarian cyst-has decreased in size.  Free fluid has resolved.  Her pain is minimal.  She declines pills for ovarian suppression.  Review warning signs symptoms and when to return to office or ER..  Plan repeat ultrasound in 4 to 6 weeks.      Return to office 4 to 6 weeks for transvaginal ultrasound.    Soraya Jordan, APRN  7/14/2022

## 2022-08-29 ENCOUNTER — TELEPHONE (OUTPATIENT)
Dept: OBSTETRICS AND GYNECOLOGY | Facility: CLINIC | Age: 22
End: 2022-08-29

## 2022-08-29 NOTE — TELEPHONE ENCOUNTER
Caller: Delphine Hernandez    Relationship to patient: Self    Best call back number: [phone]    Patient is needing: PATIENT OF DR. MEJIAS. LAST SEEN IN THE OFFICE ON 7/14 BY SHAREE GREGORIO FOR CYST ON RIGHT OVARY. PATIENT WAS DRIVING YESTERDAY- HAD A SHARP PAIN THAT FELT LIKE A DAGGER GOING INTO HER VAGINA- PT HAD TO PULL OVER AND HAD SYMPTOMS OF BLURRED VISION, DIZZINESS, AND NAUSEA- PT STATED IT CAUSED HER TO HAVE AN ASTHMA ATTACK- PATIENT DID NOT GO TO THE ER. HUB TRIED TO WARM TRANSFER. NO ANSWER. NEXT AVAILABLE APPT FOR DR. MEJIAS OR SHAREE GREGORIO IS 9/27/22. PATIENT CAN BE REACHED AT LISTED NUMBER ABOVE AT ANYTIME.

## 2022-09-13 ENCOUNTER — TELEPHONE (OUTPATIENT)
Dept: FAMILY MEDICINE CLINIC | Facility: CLINIC | Age: 22
End: 2022-09-13

## 2022-09-13 NOTE — TELEPHONE ENCOUNTER
PATIENT IS REQUESTING A CALL BACK REGARDING QUESTIONS SHE HAS ABOUT THE COVID VACCINATION.     PLEASE ADVISE [phone]

## 2022-09-13 NOTE — TELEPHONE ENCOUNTER
Pt said you were recommending that she not get the covid vaccine last year until more research came out about it.  She is now wondering what you think because she may be required to get due to her job.  She had problems with some flu vaccines in the past.

## 2022-09-14 ENCOUNTER — OFFICE VISIT (OUTPATIENT)
Dept: OBSTETRICS AND GYNECOLOGY | Facility: CLINIC | Age: 22
End: 2022-09-14

## 2022-09-14 VITALS — HEIGHT: 70 IN | SYSTOLIC BLOOD PRESSURE: 98 MMHG | BODY MASS INDEX: 17.22 KG/M2 | DIASTOLIC BLOOD PRESSURE: 62 MMHG

## 2022-09-14 DIAGNOSIS — Z97.5 IUD (INTRAUTERINE DEVICE) IN PLACE: ICD-10-CM

## 2022-09-14 DIAGNOSIS — R10.2 CHRONIC PELVIC PAIN IN FEMALE: ICD-10-CM

## 2022-09-14 DIAGNOSIS — Z86.19 HISTORY OF CHLAMYDIA: Primary | ICD-10-CM

## 2022-09-14 DIAGNOSIS — Z13.89 SCREENING FOR GENITOURINARY CONDITION: ICD-10-CM

## 2022-09-14 DIAGNOSIS — N83.201 RIGHT OVARIAN CYST: ICD-10-CM

## 2022-09-14 DIAGNOSIS — G89.29 CHRONIC PELVIC PAIN IN FEMALE: ICD-10-CM

## 2022-09-14 DIAGNOSIS — N94.10 FEMALE DYSPAREUNIA: ICD-10-CM

## 2022-09-14 PROBLEM — N89.8 VAGINAL ITCHING: Status: RESOLVED | Noted: 2022-04-26 | Resolved: 2022-09-14

## 2022-09-14 PROBLEM — G43.109 MIGRAINE WITH AURA: Status: ACTIVE | Noted: 2022-09-14

## 2022-09-14 LAB
B-HCG UR QL: NEGATIVE
BILIRUB BLD-MCNC: NEGATIVE MG/DL
CLARITY, POC: CLEAR
COLOR UR: YELLOW
EXPIRATION DATE: NORMAL
GLUCOSE UR STRIP-MCNC: NEGATIVE MG/DL
INTERNAL NEGATIVE CONTROL: NORMAL
INTERNAL POSITIVE CONTROL: NORMAL
KETONES UR QL: NEGATIVE
LEUKOCYTE EST, POC: NEGATIVE
Lab: NORMAL
NITRITE UR-MCNC: NEGATIVE MG/ML
PH UR: 5 [PH] (ref 5–8)
PROT UR STRIP-MCNC: NEGATIVE MG/DL
RBC # UR STRIP: NEGATIVE /UL
SP GR UR: 1 (ref 1–1.03)
UROBILINOGEN UR QL: NORMAL

## 2022-09-14 PROCEDURE — 99214 OFFICE O/P EST MOD 30 MIN: CPT | Performed by: OBSTETRICS & GYNECOLOGY

## 2022-09-14 PROCEDURE — 81002 URINALYSIS NONAUTO W/O SCOPE: CPT | Performed by: OBSTETRICS & GYNECOLOGY

## 2022-09-14 PROCEDURE — 81025 URINE PREGNANCY TEST: CPT | Performed by: OBSTETRICS & GYNECOLOGY

## 2022-09-14 RX ORDER — CLOBETASOL PROPIONATE 0.5 MG/G
1 OINTMENT TOPICAL 2 TIMES DAILY
Qty: 60 G | Refills: 1 | Status: SHIPPED | OUTPATIENT
Start: 2022-09-14

## 2022-09-14 RX ORDER — SODIUM CHLORIDE 0.9 % (FLUSH) 0.9 %
1-10 SYRINGE (ML) INJECTION AS NEEDED
Status: CANCELLED | OUTPATIENT
Start: 2022-09-14

## 2022-09-14 RX ORDER — SODIUM CHLORIDE 0.9 % (FLUSH) 0.9 %
3 SYRINGE (ML) INJECTION EVERY 12 HOURS SCHEDULED
Status: CANCELLED | OUTPATIENT
Start: 2022-09-14

## 2022-09-14 RX ORDER — SODIUM CHLORIDE 9 MG/ML
40 INJECTION, SOLUTION INTRAVENOUS AS NEEDED
Status: CANCELLED | OUTPATIENT
Start: 2022-09-14

## 2022-09-14 NOTE — PROGRESS NOTES
"EVALUATION AND MANAGEMENT ENCOUNTER    Delphine Hernandez  Patient new to examiner? No  New problem to examiner? No  Patient referred? No    -----------------------------------------------------HISTORY---------------------------------------------------    Chief Complaint:   Chief Complaint   Patient presents with   • Pelvic Pain       HPI:  Delphine Hernandez is a 22 y.o. No obstetric history on file. with No LMP recorded (lmp unknown). Patient has had an implant. here for recurrent pelvic pain.  This has been going on for years.  Pt has an IUD and on this and the nexplanon, her period cramps are better, but she still has stabbing pain that she's always had.  Pt also has pain around introitus.  Pt has hx chlamydia.   Pt is amenorrheic on the IUD.       ROS:  Review of Systems   Constitutional: Negative.    HENT: Negative.    Eyes: Negative.    Respiratory: Negative.    Cardiovascular: Negative.    Gastrointestinal: Negative.    Endocrine: Negative.    Genitourinary: Positive for pelvic pain.   Musculoskeletal: Negative.    Skin: Negative.    Allergic/Immunologic: Negative.    Neurological: Negative.    Hematological: Negative.    Psychiatric/Behavioral: Negative.    :    Patient reports that she is not currently experiencing any symptoms of urinary incontinence.      yesTESTED FOR CHLAMYDIA?  -----------------------------------------------PHYSICAL EXAM----------------------------------------------    Vital Signs: BP 98/62   Ht 177.8 cm (70\")   LMP  (LMP Unknown)   Breastfeeding No   BMI 17.22 kg/m²      Physical Exam  Vitals and nursing note reviewed.   Constitutional:       Appearance: She is well-developed.   HENT:      Head: Normocephalic and atraumatic.   Cardiovascular:      Rate and Rhythm: Normal rate.   Pulmonary:      Effort: Pulmonary effort is normal.   Abdominal:      General: There is no distension.      Palpations: Abdomen is soft. There is no mass.      Tenderness: There is abdominal tenderness. There is no " guarding.   Genitourinary:     Vagina: No vaginal discharge.   Musculoskeletal:         General: No tenderness or deformity. Normal range of motion.      Cervical back: Normal range of motion.   Skin:     General: Skin is warm and dry.      Coloration: Skin is not pale.      Findings: No erythema or rash.   Neurological:      Mental Status: She is alert and oriented to person, place, and time.   Psychiatric:         Behavior: Behavior normal.         Thought Content: Thought content normal.         Judgment: Judgment normal.         I saw the patient with a face mask, gloves and eye protection  The patient herself was masked.  Social distancing was observed as appropriate. All COVID precautions observed.     -----------------------------------------------MEDICAL DECISION MAKING-----------------------------    IMPRESSION/PROBLEM:      Chronic pelvic pain, hx ovarian cysts.  Hx chlamydia    PLAN:     1. U/s: Ut av, IUD is in correct place, EL wnl, multiple, bilateral small ov cysts.  No free fluid.   2. Dx laparoscopy for chronic pelvic pain despite IUD.  Pt can't take estrogen due to migraines with aura.    Diagnoses and all orders for this visit:    1. History of chlamydia (Primary)  -     Chlamydia trachomatis, Neisseria gonorrhoeae, Trichomonas vaginalis, PCR - Urine, Urine, Random Void    2. Screening for genitourinary condition  -     POC Urinalysis Dipstick  -     POC Pregnancy, Urine    3. Right ovarian cyst    4. IUD (intrauterine device) in place: Mirena 7/15/22    5. Female dyspareunia  -     Case Request; Standing  -     CBC and Differential; Future  -     Case Request    6. Chronic pelvic pain in female  -     Case Request; Standing  -     CBC and Differential; Future  -     Case Request    Other orders  -     clobetasol (TEMOVATE) 0.05 % ointment; Apply 1 application topically to the appropriate area as directed 2 (Two) Times a Day.  Dispense: 60 g; Refill: 1  -     Follow Anesthesia Guidelines /  Standing Orders; Future  -     Chlorhexidine Skin Prep; Future        Pt instructed to call for results of any testing done today if she does not hear from us, and that failure to do so could result in inadequate treatment . Pt verbalized her understanding.     RTO Return in about 4 weeks (around 10/12/2022) for postop check. FOR postop check.  Instructions and precautions given.     I spent 30+ minutes caring for Delphine on this date of service. This time includes time spent by me in the following activities: preparing for the visit, reviewing tests, obtaining and/or reviewing a separately obtained history, performing a medically appropriate examination and/or evaluation, counseling and educating the patient/family/caregiver, ordering medications, tests, or procedures, referring and communicating with other health care professionals, documenting information in the medical record, independently interpreting results and communicating that information with the patient/family/caregiver and care coordination    Jamil Prado MD  10:47 EDT  09/14/22

## 2022-09-15 PROBLEM — G89.29 CHRONIC PELVIC PAIN IN FEMALE: Status: ACTIVE | Noted: 2022-09-15

## 2022-09-15 PROBLEM — R10.2 CHRONIC PELVIC PAIN IN FEMALE: Status: ACTIVE | Noted: 2022-09-15

## 2022-09-16 LAB
C TRACH RRNA SPEC QL NAA+PROBE: NEGATIVE
N GONORRHOEA RRNA SPEC QL NAA+PROBE: NEGATIVE
T VAGINALIS RRNA SPEC QL NAA+PROBE: NEGATIVE

## 2022-09-19 NOTE — PAT
Spoke with pt via phone, reviewed medical history and answered all questions, pt does not know weight- states recovering from eating disorder and requests to not be informed of weight on day surgery. Instructed pt to call PAT with any additional questions

## 2022-09-20 ENCOUNTER — ANESTHESIA EVENT (OUTPATIENT)
Dept: PERIOP | Facility: HOSPITAL | Age: 22
End: 2022-09-20

## 2022-09-22 ENCOUNTER — ANESTHESIA (OUTPATIENT)
Dept: PERIOP | Facility: HOSPITAL | Age: 22
End: 2022-09-22

## 2022-09-22 ENCOUNTER — HOSPITAL ENCOUNTER (OUTPATIENT)
Facility: HOSPITAL | Age: 22
Setting detail: HOSPITAL OUTPATIENT SURGERY
Discharge: HOME | End: 2022-09-22
Attending: OBSTETRICS & GYNECOLOGY | Admitting: OBSTETRICS & GYNECOLOGY

## 2022-09-22 VITALS
HEIGHT: 70 IN | DIASTOLIC BLOOD PRESSURE: 67 MMHG | OXYGEN SATURATION: 99 % | HEART RATE: 82 BPM | BODY MASS INDEX: 20.59 KG/M2 | WEIGHT: 143.8 LBS | TEMPERATURE: 97.7 F | SYSTOLIC BLOOD PRESSURE: 100 MMHG | RESPIRATION RATE: 14 BRPM

## 2022-09-22 DIAGNOSIS — G89.29 CHRONIC PELVIC PAIN IN FEMALE: ICD-10-CM

## 2022-09-22 DIAGNOSIS — Z98.890 S/P LAPAROSCOPY: Primary | ICD-10-CM

## 2022-09-22 DIAGNOSIS — N94.10 FEMALE DYSPAREUNIA: ICD-10-CM

## 2022-09-22 DIAGNOSIS — R10.2 CHRONIC PELVIC PAIN IN FEMALE: ICD-10-CM

## 2022-09-22 LAB — HCG SERPL QL: NEGATIVE

## 2022-09-22 PROCEDURE — 94799 UNLISTED PULMONARY SVC/PX: CPT

## 2022-09-22 PROCEDURE — 94640 AIRWAY INHALATION TREATMENT: CPT

## 2022-09-22 PROCEDURE — 25010000002 SUCCINYLCHOLINE PER 20 MG: Performed by: REGISTERED NURSE

## 2022-09-22 PROCEDURE — 25010000002 ONDANSETRON PER 1 MG: Performed by: NURSE ANESTHETIST, CERTIFIED REGISTERED

## 2022-09-22 PROCEDURE — 84703 CHORIONIC GONADOTROPIN ASSAY: CPT | Performed by: OBSTETRICS & GYNECOLOGY

## 2022-09-22 PROCEDURE — 25010000002 FENTANYL CITRATE (PF) 100 MCG/2ML SOLUTION: Performed by: REGISTERED NURSE

## 2022-09-22 PROCEDURE — 25010000002 DEXAMETHASONE PER 1 MG: Performed by: NURSE ANESTHETIST, CERTIFIED REGISTERED

## 2022-09-22 PROCEDURE — 25010000002 MIDAZOLAM PER 1MG: Performed by: NURSE ANESTHETIST, CERTIFIED REGISTERED

## 2022-09-22 PROCEDURE — 25010000002 HYDROMORPHONE 1 MG/ML SOLUTION: Performed by: REGISTERED NURSE

## 2022-09-22 PROCEDURE — 25010000002 PROPOFOL 200 MG/20ML EMULSION: Performed by: REGISTERED NURSE

## 2022-09-22 PROCEDURE — S0260 H&P FOR SURGERY: HCPCS | Performed by: OBSTETRICS & GYNECOLOGY

## 2022-09-22 PROCEDURE — 85025 COMPLETE CBC W/AUTO DIFF WBC: CPT | Performed by: OBSTETRICS & GYNECOLOGY

## 2022-09-22 PROCEDURE — 94664 DEMO&/EVAL PT USE INHALER: CPT

## 2022-09-22 PROCEDURE — 49320 DIAG LAPARO SEPARATE PROC: CPT | Performed by: OBSTETRICS & GYNECOLOGY

## 2022-09-22 RX ORDER — DEXAMETHASONE SODIUM PHOSPHATE 4 MG/ML
8 INJECTION, SOLUTION INTRA-ARTICULAR; INTRALESIONAL; INTRAMUSCULAR; INTRAVENOUS; SOFT TISSUE ONCE AS NEEDED
Status: COMPLETED | OUTPATIENT
Start: 2022-09-22 | End: 2022-09-22

## 2022-09-22 RX ORDER — IBUPROFEN 200 MG
800 TABLET ORAL EVERY 6 HOURS PRN
COMMUNITY

## 2022-09-22 RX ORDER — BUPIVACAINE HYDROCHLORIDE 2.5 MG/ML
INJECTION, SOLUTION INFILTRATION; PERINEURAL AS NEEDED
Status: DISCONTINUED | OUTPATIENT
Start: 2022-09-22 | End: 2022-09-22 | Stop reason: HOSPADM

## 2022-09-22 RX ORDER — ONDANSETRON 2 MG/ML
4 INJECTION INTRAMUSCULAR; INTRAVENOUS ONCE AS NEEDED
Status: DISCONTINUED | OUTPATIENT
Start: 2022-09-22 | End: 2022-09-22 | Stop reason: HOSPADM

## 2022-09-22 RX ORDER — FENTANYL CITRATE 50 UG/ML
INJECTION, SOLUTION INTRAMUSCULAR; INTRAVENOUS AS NEEDED
Status: DISCONTINUED | OUTPATIENT
Start: 2022-09-22 | End: 2022-09-22 | Stop reason: SURG

## 2022-09-22 RX ORDER — MIDAZOLAM HYDROCHLORIDE 2 MG/2ML
1 INJECTION, SOLUTION INTRAMUSCULAR; INTRAVENOUS
Status: DISCONTINUED | OUTPATIENT
Start: 2022-09-22 | End: 2022-09-22 | Stop reason: HOSPADM

## 2022-09-22 RX ORDER — KETAMINE HYDROCHLORIDE 10 MG/ML
INJECTION INTRAMUSCULAR; INTRAVENOUS AS NEEDED
Status: DISCONTINUED | OUTPATIENT
Start: 2022-09-22 | End: 2022-09-22 | Stop reason: SURG

## 2022-09-22 RX ORDER — SODIUM CHLORIDE, SODIUM LACTATE, POTASSIUM CHLORIDE, CALCIUM CHLORIDE 600; 310; 30; 20 MG/100ML; MG/100ML; MG/100ML; MG/100ML
9 INJECTION, SOLUTION INTRAVENOUS CONTINUOUS PRN
Status: DISCONTINUED | OUTPATIENT
Start: 2022-09-22 | End: 2022-09-22 | Stop reason: HOSPADM

## 2022-09-22 RX ORDER — SODIUM CHLORIDE, SODIUM LACTATE, POTASSIUM CHLORIDE, CALCIUM CHLORIDE 600; 310; 30; 20 MG/100ML; MG/100ML; MG/100ML; MG/100ML
100 INJECTION, SOLUTION INTRAVENOUS CONTINUOUS
Status: DISCONTINUED | OUTPATIENT
Start: 2022-09-22 | End: 2022-09-22 | Stop reason: HOSPADM

## 2022-09-22 RX ORDER — MAGNESIUM HYDROXIDE 1200 MG/15ML
LIQUID ORAL AS NEEDED
Status: DISCONTINUED | OUTPATIENT
Start: 2022-09-22 | End: 2022-09-22 | Stop reason: HOSPADM

## 2022-09-22 RX ORDER — ROCURONIUM BROMIDE 10 MG/ML
INJECTION, SOLUTION INTRAVENOUS AS NEEDED
Status: DISCONTINUED | OUTPATIENT
Start: 2022-09-22 | End: 2022-09-22 | Stop reason: SURG

## 2022-09-22 RX ORDER — SODIUM CHLORIDE 0.9 % (FLUSH) 0.9 %
1-10 SYRINGE (ML) INJECTION AS NEEDED
Status: DISCONTINUED | OUTPATIENT
Start: 2022-09-22 | End: 2022-09-22 | Stop reason: HOSPADM

## 2022-09-22 RX ORDER — SODIUM CHLORIDE 9 MG/ML
40 INJECTION, SOLUTION INTRAVENOUS AS NEEDED
Status: DISCONTINUED | OUTPATIENT
Start: 2022-09-22 | End: 2022-09-22 | Stop reason: HOSPADM

## 2022-09-22 RX ORDER — FAMOTIDINE 10 MG/ML
20 INJECTION, SOLUTION INTRAVENOUS
Status: DISCONTINUED | OUTPATIENT
Start: 2022-09-22 | End: 2022-09-22

## 2022-09-22 RX ORDER — PROPOFOL 10 MG/ML
INJECTION, EMULSION INTRAVENOUS AS NEEDED
Status: DISCONTINUED | OUTPATIENT
Start: 2022-09-22 | End: 2022-09-22 | Stop reason: SURG

## 2022-09-22 RX ORDER — ALBUTEROL SULFATE 2.5 MG/3ML
2.5 SOLUTION RESPIRATORY (INHALATION) ONCE
Status: COMPLETED | OUTPATIENT
Start: 2022-09-22 | End: 2022-09-22

## 2022-09-22 RX ORDER — SODIUM CHLORIDE 0.9 % (FLUSH) 0.9 %
3 SYRINGE (ML) INJECTION EVERY 12 HOURS SCHEDULED
Status: DISCONTINUED | OUTPATIENT
Start: 2022-09-22 | End: 2022-09-22 | Stop reason: HOSPADM

## 2022-09-22 RX ORDER — DEXMEDETOMIDINE HYDROCHLORIDE 100 UG/ML
INJECTION, SOLUTION INTRAVENOUS AS NEEDED
Status: DISCONTINUED | OUTPATIENT
Start: 2022-09-22 | End: 2022-09-22 | Stop reason: SURG

## 2022-09-22 RX ORDER — HYDROCODONE BITARTRATE AND ACETAMINOPHEN 5; 325 MG/1; MG/1
1 TABLET ORAL EVERY 6 HOURS PRN
Qty: 10 TABLET | Refills: 0 | Status: SHIPPED | OUTPATIENT
Start: 2022-09-22 | End: 2022-09-29

## 2022-09-22 RX ORDER — SODIUM CHLORIDE 0.9 % (FLUSH) 0.9 %
10 SYRINGE (ML) INJECTION EVERY 12 HOURS SCHEDULED
Status: DISCONTINUED | OUTPATIENT
Start: 2022-09-22 | End: 2022-09-22 | Stop reason: HOSPADM

## 2022-09-22 RX ORDER — LIDOCAINE HYDROCHLORIDE 10 MG/ML
0.5 INJECTION, SOLUTION EPIDURAL; INFILTRATION; INTRACAUDAL; PERINEURAL ONCE AS NEEDED
Status: DISCONTINUED | OUTPATIENT
Start: 2022-09-22 | End: 2022-09-22 | Stop reason: HOSPADM

## 2022-09-22 RX ORDER — OXYCODONE AND ACETAMINOPHEN 7.5; 325 MG/1; MG/1
1 TABLET ORAL ONCE AS NEEDED
Status: COMPLETED | OUTPATIENT
Start: 2022-09-22 | End: 2022-09-22

## 2022-09-22 RX ORDER — FENTANYL CITRATE 50 UG/ML
25 INJECTION, SOLUTION INTRAMUSCULAR; INTRAVENOUS
Status: DISCONTINUED | OUTPATIENT
Start: 2022-09-22 | End: 2022-09-22 | Stop reason: HOSPADM

## 2022-09-22 RX ORDER — ONDANSETRON 2 MG/ML
4 INJECTION INTRAMUSCULAR; INTRAVENOUS ONCE AS NEEDED
Status: COMPLETED | OUTPATIENT
Start: 2022-09-22 | End: 2022-09-22

## 2022-09-22 RX ORDER — SUCCINYLCHOLINE CHLORIDE 20 MG/ML
INJECTION INTRAMUSCULAR; INTRAVENOUS AS NEEDED
Status: DISCONTINUED | OUTPATIENT
Start: 2022-09-22 | End: 2022-09-22 | Stop reason: SURG

## 2022-09-22 RX ORDER — SODIUM CHLORIDE 0.9 % (FLUSH) 0.9 %
10 SYRINGE (ML) INJECTION AS NEEDED
Status: DISCONTINUED | OUTPATIENT
Start: 2022-09-22 | End: 2022-09-22 | Stop reason: HOSPADM

## 2022-09-22 RX ADMIN — ALBUTEROL SULFATE 2.5 MG: 2.5 SOLUTION RESPIRATORY (INHALATION) at 09:33

## 2022-09-22 RX ADMIN — DEXMEDETOMIDINE 4 MCG: 100 INJECTION, SOLUTION, CONCENTRATE INTRAVENOUS at 11:07

## 2022-09-22 RX ADMIN — DEXMEDETOMIDINE 4 MCG: 100 INJECTION, SOLUTION, CONCENTRATE INTRAVENOUS at 10:46

## 2022-09-22 RX ADMIN — HYDROMORPHONE HYDROCHLORIDE 0.25 MG: 1 INJECTION, SOLUTION INTRAMUSCULAR; INTRAVENOUS; SUBCUTANEOUS at 12:36

## 2022-09-22 RX ADMIN — FENTANYL CITRATE 50 MCG: 50 INJECTION, SOLUTION INTRAMUSCULAR; INTRAVENOUS at 10:46

## 2022-09-22 RX ADMIN — OXYCODONE HYDROCHLORIDE AND ACETAMINOPHEN 1 TABLET: 7.5; 325 TABLET ORAL at 12:36

## 2022-09-22 RX ADMIN — SODIUM CHLORIDE, POTASSIUM CHLORIDE, SODIUM LACTATE AND CALCIUM CHLORIDE 9 ML/HR: 600; 310; 30; 20 INJECTION, SOLUTION INTRAVENOUS at 09:51

## 2022-09-22 RX ADMIN — PROPOFOL 200 MG: 10 INJECTION, EMULSION INTRAVENOUS at 10:46

## 2022-09-22 RX ADMIN — ROCURONIUM BROMIDE 25 MG: 50 INJECTION, SOLUTION INTRAVENOUS at 10:55

## 2022-09-22 RX ADMIN — FENTANYL CITRATE 50 MCG: 50 INJECTION, SOLUTION INTRAMUSCULAR; INTRAVENOUS at 10:54

## 2022-09-22 RX ADMIN — MIDAZOLAM HYDROCHLORIDE 1 MG: 1 INJECTION, SOLUTION INTRAMUSCULAR; INTRAVENOUS at 10:37

## 2022-09-22 RX ADMIN — DEXAMETHASONE SODIUM PHOSPHATE 8 MG: 4 INJECTION, SOLUTION INTRAMUSCULAR; INTRAVENOUS at 09:51

## 2022-09-22 RX ADMIN — FENTANYL CITRATE 25 MCG: 50 INJECTION, SOLUTION INTRAMUSCULAR; INTRAVENOUS at 11:11

## 2022-09-22 RX ADMIN — KETAMINE HYDROCHLORIDE 20 MG: 10 INJECTION INTRAMUSCULAR; INTRAVENOUS at 10:46

## 2022-09-22 RX ADMIN — ONDANSETRON 4 MG: 2 INJECTION INTRAMUSCULAR; INTRAVENOUS at 09:51

## 2022-09-22 RX ADMIN — DEXMEDETOMIDINE 4 MCG: 100 INJECTION, SOLUTION, CONCENTRATE INTRAVENOUS at 10:54

## 2022-09-22 RX ADMIN — HYDROMORPHONE HYDROCHLORIDE 0.25 MG: 1 INJECTION, SOLUTION INTRAMUSCULAR; INTRAVENOUS; SUBCUTANEOUS at 12:26

## 2022-09-22 RX ADMIN — SUCCINYLCHOLINE CHLORIDE 100 MG: 20 INJECTION, SOLUTION INTRAMUSCULAR; INTRAVENOUS at 10:46

## 2022-09-22 RX ADMIN — ROCURONIUM BROMIDE 5 MG: 50 INJECTION, SOLUTION INTRAVENOUS at 10:46

## 2022-09-22 RX ADMIN — SUGAMMADEX 130 MG: 100 INJECTION, SOLUTION INTRAVENOUS at 11:16

## 2022-09-22 NOTE — OP NOTE
OPERATIVE REPORT    PROCEDURE:  DIAGNOSTIC LAPAROSCOPY    PREOP DX:  Pelvic pain refractory to conservative therapy    POSTOP DX:  same    SURGEON:  Jamil Prado MD    ASSISTANT:  Assistant: Caleb Cavanaugh CSA was responsible for performing the following activities: Retraction, Suturing, Closing, Placing Dressing and Held/Positioned Camera and their skilled assistance was necessary for the success of this case.    ANESTHESIA:  GETA    FINDINGS:  Completely normal pelvis    COMPLICATIONS:  none    EBL:  1cc    IVFS:  200cc    URINE OUTPUT:  25cc    SPECIMENS: none    ANTIBIOTICS:  None          DESCRIPTION OF THE PROCEDURE:    After GETA had been induced and time out done, pt was placed in the dorso-lithotomy position and prepped and draped.  No antibiotics were given.    A sponge stick was placed in the vagina.      An [id number] port was placed without incident and the abdomen was insufflated with CO2.       Pelvic and abdominal cavities were seen in their entirety.  There were no abnormalities.      Gallbladder looked normal,  Appendix was not seen.    All instruments were removed and the incisions were reapproximated with 4-0 monocryl in a subcuticular continuous fashion.      Pt tolerated procedure well and went to the RR in satis condition.    All sponge, instrument and needle counts were correct x 3 according to the OR personnel.    Jamil Prado MD  11:23 EDT  09/22/22

## 2022-09-22 NOTE — ANESTHESIA PROCEDURE NOTES
Airway  Urgency: elective    Date/Time: 9/22/2022 10:47 AM  Airway not difficult    General Information and Staff    Patient location during procedure: OR  CRNA/CAA: Dann Bell CRNA    Indications and Patient Condition  Indications for airway management: airway protection    Preoxygenated: yes  MILS maintained throughout  Mask difficulty assessment: 1 - vent by mask    Final Airway Details  Final airway type: endotracheal airway      Successful airway: ETT    Successful intubation technique: direct laryngoscopy  Facilitating devices/methods: intubating stylet  Blade: Gallardo  Blade size: 2  ETT size (mm): 7.5  Cormack-Lehane Classification: grade IIa - partial view of glottis  Placement verified by: chest auscultation and capnometry   Measured from: lips  ETT/EBT  to lips (cm): 21  Number of attempts at approach: 1  Assessment: lips, teeth, and gum same as pre-op and atraumatic intubation

## 2022-09-22 NOTE — PERIOPERATIVE NURSING NOTE
During preop care questionnaire,  Pt responded yes to some of the suicidal history and thought screening questions. Pt denies desire or plans to hurt herself, but states she has had a history of intrusive thoughts related to death or suicide. She states she has seen a therapist in the past to address these thoughts and has learned coping mechanisms.  This information was given to Dr. Prado who spoke to patient in preop and in turn, he provided her a list of counselors that she can reach out to if desired. Suicide prevention information also added to discharge instructions.

## 2022-09-22 NOTE — ANESTHESIA POSTPROCEDURE EVALUATION
Patient: Delphine Hernandez    Procedure Summary     Date: 09/22/22 Room / Location:  [facility] OR 2 /  [facility]    Anesthesia Start: 1044 Anesthesia Stop: 1137    Procedure: DIAGNOSTIC LAPAROSCOPY (N/A Abdomen) Diagnosis:       Female dyspareunia      Chronic pelvic pain in female      (Female dyspareunia [N94.10])      (Chronic pelvic pain in female [R10.2, G89.29])    Surgeons: Jamil Prado MD Provider: Dann Bell CRNA    Anesthesia Type: general ASA Status: 2          Anesthesia Type: general    Vitals  Vitals Value Taken Time   /60 09/22/22 1245   Temp     Pulse 84 09/22/22 1247   Resp 12 09/22/22 1245   SpO2 100 % 09/22/22 1247   Vitals shown include unvalidated device data.        Post Anesthesia Care and Evaluation    Patient location during evaluation: bedside  Patient participation: complete - patient participated  Level of consciousness: awake and alert  Pain score: 0  Pain management: adequate    Airway patency: patent  Anesthetic complications: No anesthetic complications  PONV Status: none  Cardiovascular status: acceptable  Respiratory status: acceptable  Hydration status: acceptable

## 2022-09-22 NOTE — ANESTHESIA PREPROCEDURE EVALUATION
Anesthesia Evaluation     Patient summary reviewed and Nursing notes reviewed   no history of anesthetic complications:  NPO Solid Status: > 8 hours  NPO Liquid Status: > 6 hours           Airway   Mallampati: I  TM distance: >3 FB  Neck ROM: full  No difficulty expected  Dental - normal exam     Pulmonary - normal exam    breath sounds clear to auscultation  (+) asthma (no nebulizers today.),  Smoker: social.  Cardiovascular - negative cardio ROS and normal exam  Exercise tolerance: good (4-7 METS)    Rhythm: regular  Rate: normal        Neuro/Psych  (+) headaches (migraines), syncope, psychiatric history (panic attacks, night terrors) Anxiety and Depression,    Tremors: history of fainting spell, loses consciousness, sees spots, doubles over due to pain in pelvis.    ROS Comment: Fall 2 days ago due to pain, possibly hit head.  Light pain on head, no headache, confusion.  Eating disorder.  GI/Hepatic/Renal/Endo      ROS Comment: Chronic nausea    Musculoskeletal     (+) back pain,   Radiculopathy: occ.  Abdominal  - normal exam   Substance History   Alcohol use: occ. Drug use: MJ.     OB/GYN negative ob/gyn ROS         Other - negative ROS                     Anesthesia Plan    ASA 2     general     intravenous induction     Anesthetic plan, risks, benefits, and alternatives have been provided, discussed and informed consent has been obtained with: patient and mother.    Use of blood products discussed with patient and mother .       CODE STATUS:

## 2022-09-22 NOTE — H&P
PREOPERATIVE HISTORY AND PHYSICAL      Patient Care Team:  Head, SHAREE Nieves as PCP - General (Nurse Practitioner)    Chief complaint: Chronic pelvic pain in female    Pt is a 22 y.o. No obstetric history on file.  No LMP recorded (lmp unknown). Patient has had an implant.     HPI:History of Present Illness  Patient has had an implant. here for recurrent pelvic pain.  This has been going on for years.  Pt has an IUD and on this and the nexplanon, her period cramps are better, but she still has stabbing pain that she's always had.  Pt also has pain around introitus.  Pt has hx chlamydia.   Pt is amenorrheic on the IUD. U/s: Ut av, IUD is in correct place, EL wnl, multiple, bilateral small ov cysts.  No free fluid.     Will proceed with  Dx laparoscopy for chronic pelvic pain despite IUD.  Pt can't take estrogen due to migraines with aura.      PMHx:   Past Medical History:   Diagnosis Date   • Anxiety    • Asthma    • Chronic nausea    • Depression    • Hx of migraines        Current problem list:  Patient Active Problem List   Diagnosis   • Asthma   • Anxiety   • Major depressive disorder with current active episode   • IUD (intrauterine device) in place: Mirena 7/15/22   • Female dyspareunia   • Screening for STD (sexually transmitted disease)   • Candida albicans infection   • Carrier of ureaplasma urealyticum   • Right ovarian cyst   • History of chlamydia   • Migraine with aura   • Chronic pelvic pain in female       PSHx: History reviewed. No pertinent surgical history.    Social Hx:   Social History     Socioeconomic History   • Marital status: Single   Tobacco Use   • Smoking status: Never Smoker   • Smokeless tobacco: Never Used   Vaping Use   • Vaping Use: Some days   Substance and Sexual Activity   • Alcohol use: Yes     Comment: social   • Drug use: Yes     Types: Marijuana     Comment: occ   • Sexual activity: Not Currently     Partners: Male     Birth control/protection: I.U.D.       FHx:   Family  History   Problem Relation Age of Onset   • Asthma Mother    • Depression Mother    • Anxiety disorder Mother    • Asthma Maternal Grandmother    • Depression Maternal Grandmother    • Arthritis Maternal Grandmother        Debilities/Disabilities Identified: None    Emotional Behavior: Appropriate    PGyn Hx:  otherwise noncontributory    POBHx:   OB History   No obstetric history on file.       Allergies: Pepto-bismol [bismuth]    Medications:   No medications prior to admission.                            No current facility-administered medications for this encounter.    Current Outpatient Medications:   •  albuterol (PROVENTIL) (2.5 MG/3ML) 0.083% nebulizer solution, Take 2.5 mg by nebulization Every 4 (Four) Hours As Needed for Wheezing or Shortness of Air., Disp: 100 each, Rfl: 0  •  albuterol sulfate  (90 Base) MCG/ACT inhaler, Inhale 2 puffs Every 4 (Four) Hours As Needed., Disp: , Rfl:   •  clobetasol (TEMOVATE) 0.05 % ointment, Apply 1 application topically to the appropriate area as directed 2 (Two) Times a Day., Disp: 60 g, Rfl: 1  •  estradiol (ESTRACE VAGINAL) 0.1 MG/GM vaginal cream, Insert 2 g into the vagina Daily. Apply cream directly to outer vagina twice weekly only, Disp: 10 g, Rfl: 1  •  levonorgestrel (Mirena, 52 MG,) 20 MCG/24HR IUD, 1 each by Intrauterine route., Disp: , Rfl:   •  loratadine (CLARITIN) 10 MG tablet, Take 10 mg by mouth Daily., Disp: , Rfl:         Review of Systems   Constitutional: Negative for appetite change, fever and unexpected weight change.   Respiratory: Negative for cough and shortness of breath.    Cardiovascular: Negative for chest pain and palpitations.   Gastrointestinal: Negative for abdominal distention, abdominal pain, constipation, diarrhea, nausea and vomiting.   Endocrine: Negative.    Genitourinary: Positive for pelvic pain. Negative for dyspareunia, menstrual problem and vaginal discharge.   Skin: Negative.    Hematological: Negative.     Psychiatric/Behavioral: Negative for dysphoric mood and sleep disturbance. The patient is not nervous/anxious.        Vital Signs  LMP  (LMP Unknown)     Physical Exam  Vitals and nursing note reviewed.   Constitutional:       Appearance: She is well-developed.   HENT:      Head: Normocephalic and atraumatic.   Cardiovascular:      Rate and Rhythm: Normal rate.   Pulmonary:      Effort: Pulmonary effort is normal.   Abdominal:      General: There is no distension.      Palpations: Abdomen is soft. There is no mass.      Tenderness: There is no abdominal tenderness. There is no guarding.   Genitourinary:     Vagina: No vaginal discharge.   Musculoskeletal:         General: No tenderness or deformity. Normal range of motion.      Cervical back: Normal range of motion.   Skin:     General: Skin is warm and dry.      Coloration: Skin is not pale.      Findings: No erythema or rash.   Neurological:      Mental Status: She is alert and oriented to person, place, and time.   Psychiatric:         Behavior: Behavior normal.         Thought Content: Thought content normal.         Judgment: Judgment normal.             IMPRESSION:    Chronic pelvic pain in female                                    PLAN:    Procedure(s):  DIAGNOSTIC LAPAROSCOPY    RISKS, ALTERNATIVES, COMPLICATIONS OF THE PROCEDURE INCLUDING BUT NOT LIMITED TO:    INTRAOPERATIVE RISKS: INJURY TO INTERNAL AND ADJACENT ORGANS AND STRUCTURES (BOWEL, BLADDER, URETER,BLOOD VESSELS) OR HEMORRHAGE REQUIRING FURTHER SURGERY (LAPAROTOMY),  POSSIBLE NON-DIAGNOSTIC FINDINGS, DISCOVERY OF POSSIBLE MALIGNANCY, INFECTION, AND DEATH;   POSTOP COMPLICATIONS: BLEEDING, INFECTION (REQUIRING POSSIBLE REOPERATION), FAILURE OF GOAL OF SURGERY AND RECURRENCE OF ORIGINAL SYMPTOMS, PNEUMONIA, PULMONARY EMBOLISM, AND DEATH;  WERE EXPLAINED TO THE PT WHO VERBALIZED HER UNDERSTANDING.             I discussed the patients findings and my recommendations with patient and family.      Jamil Prado MD  09/22/22  07:21 EDT

## 2022-09-27 ENCOUNTER — OFFICE VISIT (OUTPATIENT)
Dept: OBSTETRICS AND GYNECOLOGY | Facility: CLINIC | Age: 22
End: 2022-09-27

## 2022-09-27 VITALS — BODY MASS INDEX: 20.63 KG/M2 | SYSTOLIC BLOOD PRESSURE: 102 MMHG | DIASTOLIC BLOOD PRESSURE: 70 MMHG | HEIGHT: 70 IN

## 2022-09-27 DIAGNOSIS — G89.29 CHRONIC PELVIC PAIN IN FEMALE: Primary | ICD-10-CM

## 2022-09-27 DIAGNOSIS — R10.2 CHRONIC PELVIC PAIN IN FEMALE: Primary | ICD-10-CM

## 2022-09-27 PROCEDURE — 99024 POSTOP FOLLOW-UP VISIT: CPT | Performed by: OBSTETRICS & GYNECOLOGY

## 2022-09-27 NOTE — PROGRESS NOTES
"      Delphine Hernandez is a 22 y.o. patient who presents for follow up of   Chief Complaint   Patient presents with   • Post-op       HPI 22-year-old patient of my partner Dr. Prado who presents for postop follow-up since he is out with illness.  She had a diagnostic laparoscopy for chronic pelvic pain.  She has an IUD in place.  Postoperatively, the patient reports no fever, pain is appropriate and waning, no bleeding, no nausea or vomiting.  She is voiding without difficulty.  Reports no incision complaints.    The following portions of the patient's history were reviewed and updated as appropriate: allergies, current medications and problem list.    Review of Systems   Constitutional: Negative for appetite change, fever and unexpected weight change.   HENT: Negative for congestion and sore throat.    Respiratory: Negative for cough and shortness of breath.    Cardiovascular: Negative for chest pain and palpitations.   Gastrointestinal: Negative for abdominal distention, abdominal pain, constipation, diarrhea, nausea and vomiting.   Endocrine: Negative.    Genitourinary: Negative for dyspareunia, menstrual problem, pelvic pain and vaginal discharge.   Skin: Negative.    Neurological: Negative for dizziness and syncope.   Hematological: Negative.    Psychiatric/Behavioral: Negative for dysphoric mood and sleep disturbance. The patient is not nervous/anxious.        /70   Ht 177.8 cm (70\")   LMP  (LMP Unknown)   BMI 20.63 kg/m²     Physical Exam  Vitals and nursing note reviewed.   Constitutional:       Appearance: She is well-developed.   HENT:      Head: Normocephalic and atraumatic.   Pulmonary:      Effort: Pulmonary effort is normal. No respiratory distress.   Abdominal:      General: There is no distension.      Palpations: Abdomen is soft. There is no mass.      Tenderness: There is no abdominal tenderness. There is no guarding or rebound.   Musculoskeletal:         General: Normal range of motion. " SUBJECTIVE:   CC: Levon Barron is an 60 year old male who presents for preventative health visit, establishment of care, follow-up regarding gout/hyperlipidemia/coronary artery disease      Patient has been advised of split billing requirements and indicates understanding: Yes  Healthy Habits:     Getting at least 3 servings of Calcium per day:  Yes    Bi-annual eye exam:  Yes    Dental care twice a year:  Yes    Sleep apnea or symptoms of sleep apnea:  None    Diet:  Regular (no restrictions)    Frequency of exercise:  4-5 days/week    Duration of exercise:  30-45 minutes    Taking medications regularly:  Yes    Medication side effects:  None    PHQ-2 Total Score: 0    Additional concerns today:  Yes              Today's PHQ-2 Score:   PHQ-2 (  [employer]) 2022   Q1: Little interest or pleasure in doing things 0   Q2: Feeling down, depressed or hopeless 0   PHQ-2 Score 0   Q1: Little interest or pleasure in doing things Not at all   Q2: Feeling down, depressed or hopeless Not at all   PHQ-2 Score 0       Abuse: Current or Past(Physical, Sexual or Emotional)- NO  Do you feel safe in your environment? YES    Have you ever done Advance Care Planning? (For example, a Health Directive, POLST, or a discussion with a medical provider or your loved ones about your wishes): No, advance care planning information given to patient to review.  Patient plans to discuss their wishes with loved ones or provider.      Social History     Tobacco Use     Smoking status: Former Smoker     Packs/day: 0.15     Years: 10.00     Pack years: 1.50     Types: Cigarettes     Start date: 10/1/1992     Quit date: 2016     Years since quittin.3     Smokeless tobacco: Never Used   Substance Use Topics     Alcohol use: Yes     Alcohol/week: 1.7 standard drinks         Alcohol Use 2022   Prescreen: >3 drinks/day or >7 drinks/week? No   No flowsheet data found.    Last PSA:   Prostate Specific Antigen Screen   Date Value Ref    Skin:     General: Skin is warm and dry.   Neurological:      Mental Status: She is alert and oriented to person, place, and time.   Psychiatric:         Behavior: Behavior normal.         Thought Content: Thought content normal.         Judgment: Judgment normal.     Incisions clean dry and intact and well-healed.    I reviewed the operative findings from the operative note with the patient.  No evidence of endometriosis, adhesions or other pathology seen.  Clinical photographs were shared with the patient on screen.      Assessment/Plan    Diagnoses and all orders for this visit:    1. Chronic pelvic pain in female (Primary)    No GYN etiology for chronic pelvic pain.  Recommend follow-up with PCP for possible GI work-up.    Return if symptoms worsen or fail to improve.      Mark Drake MD  9/27/2022  15:42 EDT   "Range Status   03/15/2016 1.0 0.0 - 3.5 ng/mL Final       Reviewed orders with patient. Reviewed health maintenance and updated orders accordingly - Yes  Labs reviewed in EPIC    Reviewed and updated as needed this visit by clinical staff    Allergies  Meds                Reviewed and updated as needed this visit by Provider                       Review of Systems   Constitutional: Negative for chills and fever.   HENT: Negative for congestion, ear pain, hearing loss and sore throat.    Eyes: Negative for pain and visual disturbance.   Respiratory: Negative for cough and shortness of breath.    Cardiovascular: Negative for chest pain, palpitations and peripheral edema.   Gastrointestinal: Negative for abdominal pain, constipation, diarrhea, heartburn, hematochezia and nausea.   Genitourinary: Negative for dysuria, frequency, genital sores, hematuria, impotence, penile discharge and urgency.   Musculoskeletal: Positive for arthralgias. Negative for joint swelling and myalgias.   Skin: Negative for rash.   Neurological: Negative for dizziness, weakness, headaches and paresthesias.   Psychiatric/Behavioral: Negative for mood changes. The patient is not nervous/anxious.       Hx nasal polyps. Has seen ENT and treated with nasal spray for 30 days. Doing  better now with Xhance   Left 1st toe gout flare. No other  joint sx.  On Prednisone and sx resolved  Had colonoscopy 1 month ago at [facility] in [place]. 3 polyps and repeat 3 years.  No report to review at this time      OBJECTIVE:   /78   Pulse 61   Temp 97.1  F (36.2  C) (Temporal)   Ht 1.753 m (5' 9\")   Wt 105.1 kg (231 lb 12.8 oz)   SpO2 97%   BMI 34.23 kg/m      Physical Exam  General appearance -   alert, no distress  Skin - No rashes.  Few scattered seborrheic keratoses on back.  Skin tag approximately 1 cm diameter right medial thigh  Head - normocephalic, atraumatic  Eyes - AMY, EOMI, fundi exam with nondilated pupils negative.  Ears - External ears " normal. Canals clear. TM's normal.  Nose/Sinuses - Nares normal. Septum midline. Mucosa normal. No drainage or sinus tenderness.  Oropharynx - No erythema, no adenopathy, no exudates.  Neck - Supple without adenopathy or thyromegaly. No bruits.  Lungs - Clear to auscultation without wheezes/rhonchi.  Heart - Regular rate and rhythm without murmurs, clicks, or gallops.  Nodes - No supraclavicular, axillary, or inguinal adenopathy palpable.  Abdomen - Abdomen obese, soft, non-tender. BS normal. No masses or hepatosplenomegaly palpable. No bruits.  Extremities -No cyanosis, clubbing or edema.    Musculoskeletal - Spine ROM normal. Muscular strength intact.  Minimal erythema left first MTP joint.  No tenderness to range of motion.  No increased warmth  Peripheral pulses - radial=4/4, femoral=4/4, posterior tibial=4/4, dorsalis pedis=4/4,  Neuro - Gait normal. Reflexes normal and symmetric. Sensation grossly WNL.  Genital - Normal-appearing male external genitalia. No scrotal masses or inguinal hernia palpable.   Rectal - Guaic negative stool. Normal tone. Prostate normal in size to palpation. No rectal masses or prostate nodularity palpable          ASSESSMENT/PLAN:   1. Encounter for routine adult medical exam with abnormal findings  Patient declines COVID vaccination booster.  Due for tetanus vaccine and prefers to get at pharmacy.  Discussed Shingrix option.  We will get medical records from [facility] regarding last colonoscopy.  Counseled regarding diet and exercise.  Screening labs as ordered  - Comprehensive metabolic panel; Future  - CBC with platelets; Future  - TSH with free T4 reflex; Future    2. Hyperlipidemia LDL goal <70  History of coronary artery disease.  [facility].  Cardiology notes indicate patient has been intolerant of higher statin doses.  Await lab results.  Possible addition of Zetia if LDL above goal.  Will address rosuvastatin refill after labs are back  - Comprehensive  "metabolic panel; Future  - Lipid panel reflex to direct LDL Fasting; Future    3. Acute gout of left foot, unspecified cause  Patient states has not had a gout flare episodes in over a year until this recent 1.  On last day of 5-day prednisone course.  Denies current toe pain.  Avoids beer but has been drinking some aroma or wine with 1 drink 5 days a week.  Patient encouraged to maintain good hydration.  Encouraged him to stop all alcohol for now, especially as it pertains to need for weight loss also.  Check uric acid level.  History of some elevation years ago.  If more frequent symptoms, will then consider allopurinol if uric acid level greater than 6  - Uric acid; Future    4. Class 1 obesity with serious comorbidity and body mass index (BMI) of 34.0 to 34.9 in adult, unspecified obesity type  Patient states he has previously undergone a negative sleep study for apnea.  Energy okay during the day.  Counseled regarding calorie/carbohydrate reduction and increase in walking exercise.  Also recommended patient discontinue alcohol use given caloric intake with this    5. History of colonic polyps  Patient states he had a colonoscopy in the last few months with 3 polyps and recommendation to recheck 3 years later.  We will get colonoscopy report from [facility] and placed in chart    6. Need for hepatitis C screening test  Candidate for screening based on age  - Hepatitis C Screen Reflex to HCV RNA Quant and Genotype; Future    7. Screening for prostate cancer  Due for screening  - Prostate Specific Antigen Screen; Future      Patient has been advised of split billing requirements and indicates understanding: Yes    COUNSELING:   Reviewed preventive health counseling, as reflected in patient instructions    Estimated body mass index is 35.74 kg/m  as calculated from the following:    Height as of 2/11/20: 1.753 m (5' 9\").    Weight as of 2/11/20: 109.8 kg (242 lb).     Weight management plan: Discussed healthy diet and " exercise guidelines    He reports that he quit smoking about 6 years ago. His smoking use included cigarettes. He started smoking about 29 years ago. He has a 1.50 pack-year smoking history. He has never used smokeless tobacco.      Counseling Resources:  ATP IV Guidelines  Pooled Cohorts Equation Calculator  FRAX Risk Assessment  ICSI Preventive Guidelines  Dietary Guidelines for Americans, 2010  USDA's MyPlate  ASA Prophylaxis  Lung CA Screening      PLAN:   Continue current meds. Will address refill after labs  Labs today as ordered  Reduce calorie/carbohydrate (sugar, bread, potato, pasta, rice, alcohol etc)  intake in diet.  Increase color on your plate with fruits and vegetables. Increase  frequency of walking or other aerobic exercise as able (goal is daily)  Vaccinations: Tetanus (Td) vaccine - get at pharmacy  Check with insurance or speak with your pharmacist re: Shingrix vaccine coverage for shingles prevention.  This is a 2 shot series done 2-6 months apart  I would recommend you receive an influenza (flu) vaccine  this Fall (October)   I would recommend a covid booster vaccination. You may have it done at any pharmacy. If you wish to have it done at a [place] pharmacy, then go to [email or web address] to schedule a vaccination appointment  Put alcohol aside for now to  Reduce uric acid. If recurrent gout flares, then possible Allopurinol for prevention  Pt was informed regarding extra E&M billing for management of new or established medical issues not related to today's wellness visit      Lior Smith MD  [facility]

## 2022-09-29 ENCOUNTER — OFFICE VISIT (OUTPATIENT)
Dept: FAMILY MEDICINE CLINIC | Facility: CLINIC | Age: 22
End: 2022-09-29

## 2022-09-29 VITALS
TEMPERATURE: 98.2 F | SYSTOLIC BLOOD PRESSURE: 104 MMHG | DIASTOLIC BLOOD PRESSURE: 62 MMHG | BODY MASS INDEX: 20.63 KG/M2 | OXYGEN SATURATION: 98 % | HEART RATE: 112 BPM | HEIGHT: 70 IN

## 2022-09-29 DIAGNOSIS — G89.29 CHRONIC PELVIC PAIN IN FEMALE: Primary | ICD-10-CM

## 2022-09-29 DIAGNOSIS — R10.2 CHRONIC PELVIC PAIN IN FEMALE: Primary | ICD-10-CM

## 2022-09-29 DIAGNOSIS — R10.84 GENERALIZED ABDOMINAL PAIN: ICD-10-CM

## 2022-09-29 PROCEDURE — 99213 OFFICE O/P EST LOW 20 MIN: CPT | Performed by: NURSE PRACTITIONER

## 2022-09-29 NOTE — PROGRESS NOTES
Patient ID: Delphine Hernandez is a 22 y.o. female     Patient Care Team:  Head, SHAREE Nieves as PCP - General (Nurse Practitioner)    Subjective     Chief Complaint   Patient presents with   • Abdominal Pain     Stabbing pains in lower abdomen, going on for 10 years. Was recently seen by gyn and ruled out gynecological issues.       History of Present Illness    Delphine Hernandez presents to [facility] today for chronic abdominal/pelvic pain.    Lower abdomen pain.  Ongoing since age 13-14.  Have ruled out GYN issue.  Recently underwent laparoscopic procedure on September 22, 2022 per Dr. Prado for diagnostic laparoscopy due to pelvic pain.  Pelvic and abdominal cavities were seen in their entirety there.  There was no abnormalities noted.  The gallbladder looked normal.  Was unable to visualize the appendix.  IUD is in correct position.  She has bilateral small ovarian cyst.  No free fluid.  Seen GI specialist a few years ago.  Work-up was stable.  Rates pain at 2-5 on normal days.  Can increase to 10 at worst.  Described as sharp, dull, achy, cramping, stabbing, dagger pains.    Positive for chronic nausea.  Rare vomiting. No constipation or diarrhea.   Last BM yesterday.  Normal movements occur once every other day.    No certain foods make it worse.    No home remedies.    Seen PT for pelvic floor dysfunction without improvement.     She denies any complaints of fever, chills, cough, chest pain, shortness of air, or any other concerns.      The following portions of the patient's history were reviewed and updated as appropriate: allergies, current medications, past family history, past medical history, past social history, past surgical history and problem list.       ROS    Vitals:    09/29/22 1520   BP: 104/62   Pulse: 112   Temp: 98.2 °F (36.8 °C)   SpO2: 98%       There were no vitals filed for this visit.  Body mass index is 20.63 kg/m².    Results for orders placed or performed  during the hospital encounter of 09/22/22   hCG, Serum, Qualitative    Specimen: Blood   Result Value Ref Range    HCG Qualitative Negative Negative         US Non-ob Transvaginal (09/14/2022 10:27)   Progress Notes by Mark Drake MD (09/27/2022 15:15)     Objective     Physical Exam  Vitals reviewed.   Constitutional:       General: She is not in acute distress.  Cardiovascular:      Rate and Rhythm: Regular rhythm. Tachycardia present.      Heart sounds: No murmur heard.  Pulmonary:      Effort: Pulmonary effort is normal.      Breath sounds: Normal breath sounds. No wheezing.   Abdominal:      General: Bowel sounds are normal. There is no distension.      Palpations: Abdomen is soft.      Hernia: No hernia is present.      Comments: Lower abdomen tenderness   Musculoskeletal:      Right lower leg: No edema.      Left lower leg: No edema.   Skin:     General: Skin is warm and dry.   Neurological:      Mental Status: She is alert and oriented to person, place, and time.            Assessment & Plan     Assessment/Plan     Diagnoses and all orders for this visit:    1. Chronic pelvic pain in female (Primary)  -     CT Abdomen Pelvis With Contrast; Future    2. Generalized abdominal pain  -     CT Abdomen Pelvis With Contrast; Future          Summary:  Delphine Hernandez presents to office today due to ongoing problem with chronic abdominal pain which has been present for the last 9 to 10 years.  She has ruled out GYN cause of symptoms.  She has seen a GI specialist in the past however is been years ago.  Recommend starting off with a CT scan abdomen and pelvis for further evaluation.  Attempted keep food diary to see if any foods make symptoms worse.  Continue Tylenol or ibuprofen as needed for pain.  Results of CT will determine further recommendations.    In the meantime, instructed to contact us sooner for any problems or concerns.    Follow Up:  Return if symptoms worsen or fail to improve.    Patient  was given instructions and counseling regarding condition or for health maintenance advice.  Please see specific information pulled into the AVS if appropriate.      Patient was wearing facemask when I entered the room and throughout our encounter. Protective equipment was worn throughout this patient encounter including a face mask.  Hand hygiene was performed before donning protective equipment and after removal when leaving the room.     Mackenzie Ndiaye, APRN  Family Medicine  [facility] [place]  09/29/22  16:16 EDT

## 2022-10-18 ENCOUNTER — HOSPITAL ENCOUNTER (OUTPATIENT)
Dept: CT IMAGING | Facility: HOSPITAL | Age: 22
Discharge: HOME | End: 2022-10-18
Admitting: NURSE PRACTITIONER

## 2022-10-18 DIAGNOSIS — R10.2 CHRONIC PELVIC PAIN IN FEMALE: ICD-10-CM

## 2022-10-18 DIAGNOSIS — R10.84 GENERALIZED ABDOMINAL PAIN: ICD-10-CM

## 2022-10-18 DIAGNOSIS — G89.29 CHRONIC PELVIC PAIN IN FEMALE: ICD-10-CM

## 2022-10-18 PROCEDURE — 0 DIATRIZOATE MEGLUMINE & SODIUM PER 1 ML: Performed by: NURSE PRACTITIONER

## 2022-10-18 PROCEDURE — 74177 CT ABD & PELVIS W/CONTRAST: CPT

## 2022-10-18 PROCEDURE — 25010000002 IOPAMIDOL 61 % SOLUTION: Performed by: NURSE PRACTITIONER

## 2022-10-18 RX ADMIN — DIATRIZOATE MEGLUMINE AND DIATRIZOATE SODIUM 30 ML: 600; 100 SOLUTION ORAL; RECTAL at 07:45

## 2022-10-18 RX ADMIN — IOPAMIDOL 100 ML: 612 INJECTION, SOLUTION INTRAVENOUS at 09:08

## 2022-10-20 DIAGNOSIS — R10.30 LOWER ABDOMINAL PAIN: Primary | ICD-10-CM

## 2022-11-03 ENCOUNTER — OFFICE VISIT (OUTPATIENT)
Dept: FAMILY MEDICINE CLINIC | Facility: CLINIC | Age: 22
End: 2022-11-03

## 2022-11-03 VITALS
TEMPERATURE: 97.8 F | SYSTOLIC BLOOD PRESSURE: 106 MMHG | HEART RATE: 97 BPM | BODY MASS INDEX: 20.63 KG/M2 | DIASTOLIC BLOOD PRESSURE: 60 MMHG | RESPIRATION RATE: 16 BRPM | HEIGHT: 70 IN | OXYGEN SATURATION: 99 %

## 2022-11-03 DIAGNOSIS — R42 DIZZINESS: Primary | ICD-10-CM

## 2022-11-03 LAB
ALBUMIN SERPL-MCNC: 4.5 G/DL (ref 3.5–5.2)
ALBUMIN/GLOB SERPL: 1.9 G/DL
ALP SERPL-CCNC: 90 U/L (ref 39–117)
ALT SERPL-CCNC: 9 U/L (ref 1–33)
AST SERPL-CCNC: 14 U/L (ref 1–32)
BASOPHILS # BLD AUTO: 0.02 10*3/MM3 (ref 0–0.2)
BASOPHILS NFR BLD AUTO: 0.3 % (ref 0–1.5)
BILIRUB SERPL-MCNC: 0.7 MG/DL (ref 0–1.2)
BUN SERPL-MCNC: 10 MG/DL (ref 6–20)
BUN/CREAT SERPL: 14.3 (ref 7–25)
CALCIUM SERPL-MCNC: 9.6 MG/DL (ref 8.6–10.5)
CHLORIDE SERPL-SCNC: 104 MMOL/L (ref 98–107)
CO2 SERPL-SCNC: 27 MMOL/L (ref 22–29)
CREAT SERPL-MCNC: 0.7 MG/DL (ref 0.57–1)
EGFRCR SERPLBLD CKD-EPI 2021: 125.6 ML/MIN/1.73
EOSINOPHIL # BLD AUTO: 0.1 10*3/MM3 (ref 0–0.4)
EOSINOPHIL NFR BLD AUTO: 1.4 % (ref 0.3–6.2)
ERYTHROCYTE [DISTWIDTH] IN BLOOD BY AUTOMATED COUNT: 12.3 % (ref 12.3–15.4)
GLOBULIN SER CALC-MCNC: 2.4 GM/DL
GLUCOSE SERPL-MCNC: 88 MG/DL (ref 65–99)
HCT VFR BLD AUTO: 42.7 % (ref 34–46.6)
HGB BLD-MCNC: 14.1 G/DL (ref 12–15.9)
IMM GRANULOCYTES # BLD AUTO: 0.02 10*3/MM3 (ref 0–0.05)
IMM GRANULOCYTES NFR BLD AUTO: 0.3 % (ref 0–0.5)
IRON SATN MFR SERPL: 38 % (ref 20–50)
IRON SERPL-MCNC: 156 MCG/DL (ref 37–145)
LYMPHOCYTES # BLD AUTO: 2.43 10*3/MM3 (ref 0.7–3.1)
LYMPHOCYTES NFR BLD AUTO: 34.5 % (ref 19.6–45.3)
MCH RBC QN AUTO: 29.7 PG (ref 26.6–33)
MCHC RBC AUTO-ENTMCNC: 33 G/DL (ref 31.5–35.7)
MCV RBC AUTO: 90.1 FL (ref 79–97)
MONOCYTES # BLD AUTO: 0.57 10*3/MM3 (ref 0.1–0.9)
MONOCYTES NFR BLD AUTO: 8.1 % (ref 5–12)
NEUTROPHILS # BLD AUTO: 3.9 10*3/MM3 (ref 1.7–7)
NEUTROPHILS NFR BLD AUTO: 55.4 % (ref 42.7–76)
NRBC BLD AUTO-RTO: 0 /100 WBC (ref 0–0.2)
PLATELET # BLD AUTO: 245 10*3/MM3 (ref 140–450)
POTASSIUM SERPL-SCNC: 4.6 MMOL/L (ref 3.5–5.2)
PROT SERPL-MCNC: 6.9 G/DL (ref 6–8.5)
RBC # BLD AUTO: 4.74 10*6/MM3 (ref 3.77–5.28)
SODIUM SERPL-SCNC: 139 MMOL/L (ref 136–145)
TIBC SERPL-MCNC: 411 MCG/DL
TSH SERPL DL<=0.005 MIU/L-ACNC: 1.05 UIU/ML (ref 0.27–4.2)
UIBC SERPL-MCNC: 255 MCG/DL (ref 112–346)
WBC # BLD AUTO: 7.04 10*3/MM3 (ref 3.4–10.8)

## 2022-11-03 PROCEDURE — 93000 ELECTROCARDIOGRAM COMPLETE: CPT | Performed by: NURSE PRACTITIONER

## 2022-11-03 PROCEDURE — 99214 OFFICE O/P EST MOD 30 MIN: CPT | Performed by: NURSE PRACTITIONER

## 2022-11-03 NOTE — PROGRESS NOTES
Patient ID: Delphine Hernandez is a 22 y.o. female     Patient Care Team:  Head, SHAREE Nieves as PCP - General (Nurse Practitioner)    Subjective     Chief Complaint   Patient presents with   • Loss of Consciousness   • Nausea   • Tinnitus       Delphine Hernandez presents to [facility] today for continued problems with dizziness.  States and will start off with the ringing in your ears and then she will have vision changes.  States she is actually passed out a few times without any injuries.  States this has been ongoing for years.  She has been researching concerning this.  She feels she possibly has POTS.      Dizziness  This is a recurrent problem. The current episode started more than 1 year ago. The problem occurs daily. The problem has been waxing and waning. Associated symptoms include fatigue, headaches, nausea, vertigo, a visual change and vomiting. Pertinent negatives include no fever. Associated symptoms comments: Shortness of breath with exertion. The symptoms are aggravated by standing. She has tried rest and position changes for the symptoms. The treatment provided mild relief.     Last episode of symptoms was yesterday.  She is able to control with slowly changing positions.      History of Covid in January.  Symptoms have been ongoing for years.    Symptoms that occur with dizzy episodes:  Ringing in ears, Feels like she is under water, sees spots, vision gets blurry.  Room starts spinning.      She denies any complaints of fever, chills, cough, chest pain, shortness of air, abdominal pain, nausea, or any other concerns.     The following portions of the patient's history were reviewed and updated as appropriate: allergies, current medications, past family history, past medical history, past social history, past surgical history and problem list.       Review of Systems   Constitutional: Positive for fatigue. Negative for fever.   Gastrointestinal: Positive for nausea and  vomiting.   Neurological: Positive for dizziness, headaches and vertigo.        Vitals:    11/03/22 0738   BP: 106/60   Pulse: 97   Resp: 16   Temp: 97.8 °F (36.6 °C)   SpO2: 99%       There were no vitals filed for this visit.  Body mass index is 20.63 kg/m².    Results for orders placed or performed during the hospital encounter of 09/22/22   hCG, Serum, Qualitative    Specimen: Blood   Result Value Ref Range    HCG Qualitative Negative Negative           Objective     Physical Exam  Vitals reviewed.   Constitutional:       General: She is not in acute distress.  HENT:      Head: Normocephalic and atraumatic.      Right Ear: Tympanic membrane is bulging.      Left Ear: Tympanic membrane is bulging.      Mouth/Throat:      Pharynx: No posterior oropharyngeal erythema.   Eyes:      Extraocular Movements: Extraocular movements intact.      Right eye: No nystagmus.      Left eye: No nystagmus.      Pupils: Pupils are equal, round, and reactive to light.   Cardiovascular:      Rate and Rhythm: Normal rate and regular rhythm.      Heart sounds: No murmur heard.  Pulmonary:      Effort: Pulmonary effort is normal.      Breath sounds: Normal breath sounds. No wheezing.   Musculoskeletal:      Right lower leg: No edema.      Left lower leg: No edema.   Neurological:      Mental Status: She is alert and oriented to person, place, and time.      Cranial Nerves: No cranial nerve deficit.      Motor: No weakness.   Psychiatric:         Mood and Affect: Mood normal.         Behavior: Behavior normal.               ECG 12 Lead    Date/Time: 11/3/2022 11:10 AM  Performed by: Mackenzie Ndiaye APRN  Authorized by: Mackenzie Ndiaye APRN   Previous ECG: no previous ECG available  Rhythm: sinus rhythm  Rate: normal  Conduction: conduction normal  ST Segments: ST segments normal  T Waves: T waves normal  QRS axis: normal  Other: no other findings    Clinical impression: normal ECG          Vitals:    11/03/22 0738 11/03/22 0745 11/03/22 0750  11/03/22 0755   Orthostatic BP:  100/60 102/62 96/60   Orthostatic Pulse:  70 80 94   Patient Position: Sitting Lying Sitting Standing          Assessment & Plan     Assessment/Plan     Diagnoses and all orders for this visit:    1. Dizziness (Primary)  -     CBC & Differential  -     Comprehensive Metabolic Panel  -     TSH Rfx On Abnormal To Free T4  -     Iron and TIBC  -     ECG 12 Lead        Summary:  Delphine Hernandez has had chronic problems with dizziness.  She feels she suffers from POTS.  EKG completed in office today appears stable.  Orthostatics are normal.  Advised next step will be to check labs including iron levels.  Results will determine further recommendations.  Advised if all are stable, will need echo and Holter monitor.  In the meantime, she needs to make sure she is drinking plenty of fluids.  Change positions slowly.    In the meantime, instructed to contact us sooner for any problems or concerns.    Follow Up:  Return if symptoms worsen or fail to improve.    Patient was given instructions and counseling regarding condition or for health maintenance advice.  Please see specific information pulled into the AVS if appropriate.      Patient was wearing facemask when I entered the room and throughout our encounter. Protective equipment was worn throughout this patient encounter including a face mask.  Hand hygiene was performed before donning protective equipment and after removal when leaving the room.     SHAREE Mccray  Family Medicine  [facility]  11/03/22  13:43 EDT

## 2022-11-04 DIAGNOSIS — R42 DIZZINESS: Primary | ICD-10-CM

## 2022-11-16 ENCOUNTER — HOSPITAL ENCOUNTER (OUTPATIENT)
Dept: CARDIOLOGY | Facility: HOSPITAL | Age: 22
Discharge: HOME | End: 2022-11-16

## 2022-11-16 VITALS
HEIGHT: 70 IN | SYSTOLIC BLOOD PRESSURE: 98 MMHG | BODY MASS INDEX: 20.47 KG/M2 | DIASTOLIC BLOOD PRESSURE: 62 MMHG | WEIGHT: 143 LBS

## 2022-11-16 DIAGNOSIS — R42 DIZZINESS: ICD-10-CM

## 2022-11-16 LAB
AORTIC DIMENSIONLESS INDEX: 0.7 (DI)
BH CV ECHO MEAS - ACS: 2.11 CM
BH CV ECHO MEAS - AO MAX PG: 4.8 MMHG
BH CV ECHO MEAS - AO MEAN PG: 3 MMHG
BH CV ECHO MEAS - AO ROOT DIAM: 2.8 CM
BH CV ECHO MEAS - AO V2 MAX: 109 CM/SEC
BH CV ECHO MEAS - AO V2 VTI: 20.7 CM
BH CV ECHO MEAS - AVA(I,D): 2.34 CM2
BH CV ECHO MEAS - EDV(CUBED): 85.2 ML
BH CV ECHO MEAS - EDV(MOD-SP2): 79 ML
BH CV ECHO MEAS - EDV(MOD-SP4): 78 ML
BH CV ECHO MEAS - EF(MOD-BP): 62.2 %
BH CV ECHO MEAS - EF(MOD-SP2): 62 %
BH CV ECHO MEAS - EF(MOD-SP4): 65.4 %
BH CV ECHO MEAS - ESV(CUBED): 19.9 ML
BH CV ECHO MEAS - ESV(MOD-SP2): 30 ML
BH CV ECHO MEAS - ESV(MOD-SP4): 27 ML
BH CV ECHO MEAS - FS: 38.4 %
BH CV ECHO MEAS - IVS/LVPW: 0.88 CM
BH CV ECHO MEAS - IVSD: 0.7 CM
BH CV ECHO MEAS - LAT PEAK E' VEL: 20.3 CM/SEC
BH CV ECHO MEAS - LV DIASTOLIC VOL/BSA (35-75): 43.1 CM2
BH CV ECHO MEAS - LV MASS(C)D: 100.6 GRAMS
BH CV ECHO MEAS - LV MAX PG: 2.8 MMHG
BH CV ECHO MEAS - LV MEAN PG: 1 MMHG
BH CV ECHO MEAS - LV SYSTOLIC VOL/BSA (12-30): 14.9 CM2
BH CV ECHO MEAS - LV V1 MAX: 83.3 CM/SEC
BH CV ECHO MEAS - LV V1 VTI: 15.2 CM
BH CV ECHO MEAS - LVIDD: 4.4 CM
BH CV ECHO MEAS - LVIDS: 2.7 CM
BH CV ECHO MEAS - LVOT AREA: 3.2 CM2
BH CV ECHO MEAS - LVOT DIAM: 2.02 CM
BH CV ECHO MEAS - LVPWD: 0.8 CM
BH CV ECHO MEAS - MED PEAK E' VEL: 12.6 CM/SEC
BH CV ECHO MEAS - MV A MAX VEL: 62.4 CM/SEC
BH CV ECHO MEAS - MV DEC SLOPE: 391.5 CM/SEC2
BH CV ECHO MEAS - MV DEC TIME: 0.19 MSEC
BH CV ECHO MEAS - MV E MAX VEL: 80.2 CM/SEC
BH CV ECHO MEAS - MV E/A: 1.29
BH CV ECHO MEAS - MV MAX PG: 3.1 MMHG
BH CV ECHO MEAS - MV MEAN PG: 1.37 MMHG
BH CV ECHO MEAS - MV P1/2T: 70.8 MSEC
BH CV ECHO MEAS - MV V2 VTI: 20.1 CM
BH CV ECHO MEAS - MVA(P1/2T): 3.1 CM2
BH CV ECHO MEAS - MVA(VTI): 2.41 CM2
BH CV ECHO MEAS - PA V2 MAX: 107.5 CM/SEC
BH CV ECHO MEAS - PI END-D VEL: 120.3 CM/SEC
BH CV ECHO MEAS - QP/QS: 0.81
BH CV ECHO MEAS - RAP SYSTOLE: 3 MMHG
BH CV ECHO MEAS - RV MAX PG: 2.15 MMHG
BH CV ECHO MEAS - RV V1 MAX: 73.4 CM/SEC
BH CV ECHO MEAS - RV V1 VTI: 17.1 CM
BH CV ECHO MEAS - RVOT DIAM: 1.71 CM
BH CV ECHO MEAS - RVSP: 17.2 MMHG
BH CV ECHO MEAS - SI(MOD-SP2): 27.1 ML/M2
BH CV ECHO MEAS - SI(MOD-SP4): 28.2 ML/M2
BH CV ECHO MEAS - SV(LVOT): 48.5 ML
BH CV ECHO MEAS - SV(MOD-SP2): 49 ML
BH CV ECHO MEAS - SV(MOD-SP4): 51 ML
BH CV ECHO MEAS - SV(RVOT): 39.1 ML
BH CV ECHO MEAS - TR MAX PG: 14.2 MMHG
BH CV ECHO MEAS - TR MAX VEL: 188.2 CM/SEC
BH CV ECHO MEASUREMENTS AVERAGE E/E' RATIO: 4.88
BH CV XLRA - TDI S': 9.8 CM/SEC
LEFT ATRIUM VOLUME INDEX: 21.8 ML/M2
MAXIMAL PREDICTED HEART RATE: 198 BPM
SINUS: 2.31 CM
STJ: 2.01 CM
STRESS TARGET HR: 168 BPM

## 2022-11-16 PROCEDURE — 93306 TTE W/DOPPLER COMPLETE: CPT | Performed by: INTERNAL MEDICINE

## 2022-11-16 PROCEDURE — 93226 XTRNL ECG REC<48 HR SCAN A/R: CPT

## 2022-11-16 PROCEDURE — 93306 TTE W/DOPPLER COMPLETE: CPT

## 2022-11-16 PROCEDURE — 93225 XTRNL ECG REC<48 HRS REC: CPT

## 2022-11-21 LAB
MAXIMAL PREDICTED HEART RATE: 198 BPM
STRESS TARGET HR: 168 BPM

## 2022-11-21 PROCEDURE — 93227 XTRNL ECG REC<48 HR R&I: CPT | Performed by: INTERNAL MEDICINE

## 2022-11-27 NOTE — PROGRESS NOTES
"Chief Complaint   Patient presents with   • Pelvic Pain           History of Present Illness  Patient today for consultation on chronic and worsening lower abdominal pain.  She had a CT of the abdomen in October 2022 that was unremarkable.  Diagnostic laparoscopy negative.    Pain has been present since teenage years. Pain in mid and lower abdomen. Pain is constant and also described as random. Pain are stabbing with some aching. Feels like ice pick. No change or relation to bowel movements. Pain also originates from groin at times.   Pain is present a couple of times per week but can be more frequent, no particular patten.     BM without rectal bleeding or mucous.     Chronic nausea, not related to pain. This is long standing.     She is in recover for ED, she denies weight loss.     No family history of colon cancer.    From Primary care:    Lower abdomen pain.  Ongoing since age 13-14.  Have ruled out GYN issue.  Recently underwent laparoscopic procedure on September 22, 2022 per Dr. Prado for diagnostic laparoscopy due to pelvic pain.  Pelvic and abdominal cavities were seen in their entirety there.  There was no abnormalities noted.  The gallbladder looked normal.  Was unable to visualize the appendix.  IUD is in correct position.  She has bilateral small ovarian cyst.  No free fluid.    Result Review :       CT Abdomen Pelvis With Contrast (10/18/2022 09:17)  Comprehensive Metabolic Panel (11/03/2022 08:32)  CBC & Differential (11/03/2022 08:32)      Vital Signs:   /68   Pulse 97   Temp 97.7 °F (36.5 °C)   Ht 177.8 cm (70\")   SpO2 98%   BMI 20.52 kg/m²     Body mass index is 20.52 kg/m².     Physical Exam  Vitals reviewed.   Constitutional:       Appearance: Normal appearance.   Abdominal:      General: Bowel sounds are normal. There is no distension.      Palpations: Abdomen is soft. Abdomen is not rigid. There is no mass or pulsatile mass.      Tenderness: There is no abdominal tenderness. " There is no guarding or rebound.       Assessment and Plan    Diagnoses and all orders for this visit:    1. Generalized abdominal pain (Primary)    2. Chronic pelvic pain in female       Pelvic pain without GI symptoms, suspect unlikely GI etiology at this time.   Offered colonoscopy or flexible sigmoidoscopy for direct visualization however suspect this will be unremarkable.   If symptoms change with localization to GI track, encouraged patient to contact the office.   BRIEF SUMMARY  Patient here for consultation for pelvic pain.  She describes intermittent episodes of sharp stabbing pelvic pain that have been ongoing for close to 10 years.  CT negative for inflammation/IBD. She denies any constipation or straining.  She has no defecation urgency or diarrhea or abdominal cramping.  She denies any association of the pain with meals.  She has no mucus in the stools or rectal bleeding to suggest proctitis.  At this point, I feel it is very unlikely that she has a GI source of her pain.  We offered endoscopic evaluation with flexible sigmoidoscopy or colonoscopy but feel likely to be low yield given absence of GI specific symptoms and patient wishes to defer on this at this time. Doubt Bentyl would be helpful given the absence of cramping or diarrhea.     I have reviewed and confirmed the accuracy of the HPI and Assessment and Plan as documented by the APRN SHAREE Magaña        Follow Up   No follow-ups on file.    There are no Patient Instructions on file for this visit.

## 2022-11-29 ENCOUNTER — OFFICE VISIT (OUTPATIENT)
Dept: GASTROENTEROLOGY | Facility: CLINIC | Age: 22
End: 2022-11-29

## 2022-11-29 VITALS
OXYGEN SATURATION: 98 % | SYSTOLIC BLOOD PRESSURE: 112 MMHG | DIASTOLIC BLOOD PRESSURE: 68 MMHG | BODY MASS INDEX: 20.52 KG/M2 | HEART RATE: 97 BPM | TEMPERATURE: 97.7 F | HEIGHT: 70 IN

## 2022-11-29 DIAGNOSIS — R10.2 CHRONIC PELVIC PAIN IN FEMALE: ICD-10-CM

## 2022-11-29 DIAGNOSIS — R10.84 GENERALIZED ABDOMINAL PAIN: Primary | ICD-10-CM

## 2022-11-29 DIAGNOSIS — G89.29 CHRONIC PELVIC PAIN IN FEMALE: ICD-10-CM

## 2022-11-29 DIAGNOSIS — R42 DIZZINESS: Primary | ICD-10-CM

## 2022-11-29 PROCEDURE — 99203 OFFICE O/P NEW LOW 30 MIN: CPT | Performed by: INTERNAL MEDICINE

## 2022-11-29 NOTE — PROGRESS NOTES
Current symptoms does not appear to be related to POTS syndrome due to negative cardiac testing.  Due to continued dizziness and ongoing for a year, next step would be head CT which I have ordered.

## 2022-12-06 ENCOUNTER — TELEPHONE (OUTPATIENT)
Dept: FAMILY MEDICINE CLINIC | Facility: CLINIC | Age: 22
End: 2022-12-06

## 2022-12-06 NOTE — TELEPHONE ENCOUNTER
Bernadine with [employer] called to tell you the CT Head was denied by pt insurance.  If you would like to do a peer to peer:  [phone]  Service # [id number]  Would you like to cancel, do peer to peer or move appointment farther out?

## 2022-12-07 ENCOUNTER — HOSPITAL ENCOUNTER (OUTPATIENT)
Dept: CT IMAGING | Facility: HOSPITAL | Age: 22
Discharge: HOME | End: 2022-12-07

## 2023-02-10 ENCOUNTER — TELEPHONE (OUTPATIENT)
Dept: FAMILY MEDICINE CLINIC | Facility: CLINIC | Age: 23
End: 2023-02-10

## 2023-02-10 NOTE — TELEPHONE ENCOUNTER
Caller: Delphine Hernandez    Relationship: Self    Best call back number: [phone]    Who are you requesting to speak with (clinical staff, provider,  specific staff member): REE OR HER NURSE     What was the call regarding: PATIENT WOULD LIKE FURTHER INFORMATION ON HOW SHE CAN OBTAIN A MEDICAL MARIJUANA CARD. PLEASE ADVISE PATIENT.     PATIENT ALSO STATES THAT SHE IS STILL HAVING ABDOMINAL PAIN, SHE HAS GONE THROUGH HER OBGYN, HER GASTROINTEROLOGIST DOCTOR AND THERE WAS NOTHING THAT THEY SAW. SHE WOULD LIKE TO KNOW IF THERE WERE ANY OTHER SPECIALISTS THAT WOULD BE RECOMMENDED OR TESTS THAT CAN BE DONE TO HELP HER WITH THIS. PLEASE ADVISE.     Do you require a callback: YES

## 2023-02-13 DIAGNOSIS — R10.2 CHRONIC PELVIC PAIN IN FEMALE: Primary | ICD-10-CM

## 2023-02-13 DIAGNOSIS — G89.29 CHRONIC PELVIC PAIN IN FEMALE: Primary | ICD-10-CM

## 2023-02-13 NOTE — TELEPHONE ENCOUNTER
Spoke with pt.  She did try PT last year for a couple months and it helped so she stopped going.  She would like a new order.  Also, pt requested a mental health referral to see if she can get a medical marijuana card that way.  I will mail her a copy of our referral list.

## 2023-03-28 RX ORDER — ALBUTEROL SULFATE 90 UG/1
2 AEROSOL, METERED RESPIRATORY (INHALATION) EVERY 4 HOURS PRN
Qty: 18 G | Refills: 0 | Status: SHIPPED | OUTPATIENT
Start: 2023-03-28

## 2023-03-28 NOTE — TELEPHONE ENCOUNTER
Caller: Delphine Hernandez    Relationship: Self    Best call back number: [phone]    Requested Prescriptions:   Requested Prescriptions     Pending Prescriptions Disp Refills   • albuterol sulfate  (90 Base) MCG/ACT inhaler       Sig: Inhale 2 puffs Every 4 (Four) Hours As Needed.        Pharmacy where request should be sent: [id number] - [place] [address] AT [facility] [address] - [phone]  - [phone] FX     Last office visit with prescribing clinician: 11/3/2022   Last telemedicine visit with prescribing clinician: Visit date not found   Next office visit with prescribing clinician: Visit date not found     Does the patient have less than a 3 day supply:  [x] Yes  [] No    Would you like a call back once the refill request has been completed: [x] Yes [] No    If the office needs to give you a call back, can they leave a voicemail: [x] Yes [] No    Joo Daniels Rep   03/28/23 10:00 EDT

## 2023-03-30 ENCOUNTER — HOSPITAL ENCOUNTER (OUTPATIENT)
Dept: PHYSICAL THERAPY | Facility: HOSPITAL | Age: 23
Setting detail: THERAPIES SERIES
Discharge: HOME | End: 2023-03-30
Payer: COMMERCIAL

## 2023-03-30 DIAGNOSIS — R10.2 PELVIC PAIN: Primary | ICD-10-CM

## 2023-03-30 DIAGNOSIS — M54.50 LOW BACK PAIN, NON-SPECIFIC: ICD-10-CM

## 2023-03-30 DIAGNOSIS — M62.81 MUSCLE WEAKNESS: ICD-10-CM

## 2023-03-30 PROCEDURE — 97162 PT EVAL MOD COMPLEX 30 MIN: CPT

## 2023-03-30 PROCEDURE — 97530 THERAPEUTIC ACTIVITIES: CPT

## 2023-03-30 NOTE — THERAPY EVALUATION
Outpatient Physical Therapy Ortho Initial Evaluation  [facility]     Patient Name: Delphine Hernandez  : 2000  MRN: [id number]  Today's Date: 3/30/2023      Visit Date: 2023    Patient Active Problem List   Diagnosis   • Asthma   • Anxiety   • Major depressive disorder with current active episode   • IUD (intrauterine device) in place: Mirena 7/15/22   • Female dyspareunia   • Screening for STD (sexually transmitted disease)   • Candida albicans infection   • Carrier of ureaplasma urealyticum   • Right ovarian cyst   • History of chlamydia   • Migraine with aura   • Chronic pelvic pain in female        Past Medical History:   Diagnosis Date   • Anxiety    • Asthma    • Chronic nausea    • Depression    • Hx of migraines         Past Surgical History:   Procedure Laterality Date   • DIAGNOSTIC LAPAROSCOPY N/A 2022    Procedure: DIAGNOSTIC LAPAROSCOPY;  Surgeon: Jamil Prado MD;  Location: [facility];  Service: Obstetrics/Gynecology;  Laterality: N/A;       Visit Dx:     ICD-10-CM ICD-9-CM   1. Pelvic pain  R10.2 [id number]   2. Muscle weakness  M62.81 728.87   3. Low back pain, non-specific  M54.50 724.2          Patient History     Row Name 23 1200             History    Chief Complaint Pain  -RS      Type of Pain Other pain  pelvoc/abdominal  -RS         Pain     Is your sleep disturbed? No  -RS         Fall Risk Assessment    Any falls in the past year: No  -RS         Services    Are you currently receiving Home Health services No  -RS         Daily Activities    Primary Language English  -RS      How does patient learn best? Reading;Demonstration  -RS      Pt Participated in POC and Goals Yes  -RS         Safety    Are you being hurt, hit, or frightened by anyone at home or in your life? No  -RS      Are you being neglected by a caregiver No  -RS            User Key  (r) = Recorded By, (t) = Taken By, (c) = Cosigned By    Initials Name Provider Type    Kamille Chowdhury  PT Physical Therapist                             Pelvic Health     Row Name 03/30/23 1100             Subjective Comments    Subjective Comments The pt is a 21 yo female who presents with pelvic and lower abdominal pain. She has been to pelvic PT in the past about 10 months ago, she felt better but did not continue with her dilator and exercise training. Sporadically throughout the day she gets stabbing pains that last a minute or two but sometimes it stays sore throughout the day. She has occasionally passed out and fallen due to the pain, this has been happening since high school. She also has pain with intercourse and reports tearing/ ripping. She works at [employer] but will start a new [employer] job soon.  She reports frequent vaginal discharge that is not in a pattern. She also reports occasional numbness following intercourse.  -RS         Pregnancy Questions    Do you have radicular pain or numbness? Variable  -RS      Are you currently exercising? --  walking her dog  -RS         Lumbar/SI Special Tests    SLR (Neural Tension) Bilateral:;Negative  -RS         Lumbosacral Palpation    Pelvic Floor Bilateral:;Tender;Guarded/taut  -RS         Pelvic Floor Muscle    Patient/Parent/Guardian Consented to Internal Pelvic Floor Exam Yes  -RS      Strength (Right) 2: Squeeze no lift  -RS      Strength (Left) 2: Squeeze no lift  -RS      Symmetry of Sustained Maximal Contraction Symmetrical  -RS      Endurance (Ability to Hold Maximal Contraction) 2 sec  -RS      # of Reps of Maximal Contractions while Maintaining Endurance and Strength 0 unable to hold 10 seconds  -RS      Fast Contraction (# of 1 sec contractions performed) 2  -RS      Internal Pelvic Floor Comments pt TTP at introidus R>L, TTP and increased tone at bulbo and ischiocavernosus bilaterally (L>R), pain reproduction at levator ani (pain in abdomen bilateral), mild pain Ob internus  -RS         Observations    Perineal Observation Performed? Yes  -RS          Observation of Contraction in Perineum    Anal Aurora Present  -RS      Bears Down with Contraction Present  -RS         Pelvic Floor    Ability to Isolate Contraction of Pelvic Floor Yes  -RS      Overflow from Adjacent Muscles Upper Abdominals  -RS         Cough    Abdominal Contraction Yes  -RS      Leak None  -RS      Contraction of PC No  -RS      Bulge Yes  -RS         Education Provided On:    Education Points Behavioral modifications  -RS      Method of Delivery Verbal;Demonstration  -RS      Education Provided To Patient  -RS      Level of Understanding Verbalized  -RS         General ROM    GENERAL ROM COMMENTS B hip Er/IR/flex AROM WNL and symmetrical  -RS         MMT (Manual Muscle Testing)    Rt Lower Ext Rt Hip Flexion;Rt Hip Extension;Rt Hip ABduction;Rt Hip Internal (Medial) Rotation;Rt Hip External (Lateral) Rotation;Rt Knee WNL;Rt Ankle WNL  -RS      Lt Lower Ext Lt Hip Flexion;Lt Hip Extension;Lt Hip ABduction;Lt Hip Internal (Medial) Rotation;Lt Hip External (Lateral) Rotation;Lt Knee WNL;Lt Ankle WNL  -RS         MMT Right Lower Ext    Rt Hip Flexion MMT, Gross Movement (4+/5) good plus  -RS      Rt Hip Extension MMT, Gross Movement (4/5) good  -RS      Rt Hip ABduction MMT, Gross Movement (4/5) good  -RS      Rt Hip Internal (Medial) Rotation MMT, Gross Movement (4/5) good  -RS      Rt Hip External (Lateral) Rotation MMT, Gross Movement (4/5) good  -RS         MMT Left Lower Ext    Lt Hip Flexion MMT, Gross Movement (4+/5) good plus  -RS      Lt Hip Extension MMT, Gross Movement (4+/5) good plus  -RS      Lt Hip ABduction MMT, Gross Movement (4/5) good  -RS      Lt Hip Internal (Medial) Rotation MMT, Gross Movement (4/5) good  -RS      Lt Hip External (Lateral) Rotation MMT, Gross Movement (4+/5) good plus  -RS            User Key  (r) = Recorded By, (t) = Taken By, (c) = Cosigned By    Initials Name Provider Type    RS Kamille Loza PT Physical Therapist                       PT OP  Goals     Row Name 03/30/23 1200          PT Short Term Goals    STG Date to Achieve 05/14/23  -RS     STG 1 The pt will demonstrate IND and compliant with stretch at introidus on most days of the week to facilitate improved pelvic floor mobility at 1st layer.  -RS     STG 1 Progress New  -RS     STG 2 The pt will demonstrate IND with diaphragmatic breathing .  -RS     STG 2 Progress New  -RS        Long Term Goals    LTG Date to Achieve 06/28/23  -RS     LTG 1 The pt will report at least 60% reduction in frequency of sharp/stabbing abdominal pain during typical activities to facilitate improved functional activity tolerance.  -RS     LTG 1 Progress New  -RS     LTG 2 The pt will demonstrate full pelvic floor excursion to indicate improved motor control of the pelvic floor.  -RS     LTG 2 Progress New  -RS     LTG 3 The pt will report at least 70% reduction in pain with intercourse to facilitate improved iADL performance.  -RS     LTG 3 Progress New  -RS        Time Calculation    PT Goal Re-Cert Due Date 06/28/23  -RS           User Key  (r) = Recorded By, (t) = Taken By, (c) = Cosigned By    Initials Name Provider Type    RS Kamille Loza, PT Physical Therapist                 PT Assessment/Plan     Row Name 03/30/23 1300          PT Assessment    Functional Limitations Other (comment);Performance in work activities;Performance in self-care ADL;Performance in leisure activities  -RS     Impairments Impaired flexibility;Impaired muscle endurance;Impaired muscle length;Impaired muscle power;Range of motion;Posture;Pain  -RS     Assessment Comments Delphine Hernandez is a 22 y.o. female referred to physical therapy for pelvic and abdominal pain. She presents with an evolving clinical presentation, along with no remarkable comorbidities and personal factors of chronicity of symptoms, pt reports of difficulty with HEP compliance in the past that may impact her progress in the plan of care. Pt presents today with  decreased lateral hip strength R weaker than L, tenderness to palpation pelvic floor with increased tension at all layers with pain in pelvis and abdomen reproduced with levator ani palpation, impaired pelvic floor strength/excursion likely due to elevated resting position of pelvic floor. her signs and symptoms are consistent with referring diagnosis. The previous impairments limit her ability to sexual intercourse without pain, pelvic exam without pain, daily activities without abdominal pain. Pt will benefit from skilled PT to address the previous impairments and return to PLOF.  -RS     Please refer to paper survey for additional self-reported information No  -RS     Rehab Potential Good  -RS     Patient/caregiver participated in establishment of treatment plan and goals Yes  -RS     Patient would benefit from skilled therapy intervention Yes  -RS        PT Plan    PT Frequency 1x/week  -RS     Predicted Duration of Therapy Intervention (PT) 90 days, 12 sessions  -RS     Planned CPT's? PT EVAL MOD COMPLELITY: 55227;PT RE-EVAL: 49857;PT THER PROC EA 15 MIN: 81272;PT THER ACT EA 15 MIN: 10186;PT MANUAL THERAPY EA 15 MIN: 28554;PT NEUROMUSC RE-EDUCATION EA 15 MIN: 59728;PT SELF CARE/HOME MGMT/TRAIN EA 15: 38890;PT ELECTRICAL STIM UNATTEND: ;PT BIOFEEDBACK LUANN/ANO/URETHRAL: 49083  -RS     PT Plan Comments Review self stretching, initiate diaphragmatic breathing in multiple positions, pelvic floor relaxation and stretching. Keep HEP simple as pt reports difficulty with compliance in the past.  -RS           User Key  (r) = Recorded By, (t) = Taken By, (c) = Cosigned By    Initials Name Provider Type    RS Kamille Loza PT Physical Therapist                   OP Exercises     Row Name 03/30/23 1100             Subjective Comments    Subjective Comments The pt is a 23 yo female who presents with pelvic and lower abdominal pain. She has been to pelvic PT in the past about 10 months ago, she felt better but  did not continue with her dilator and exercise training. Sporadically throughout the day she gets stabbing pains that last a minute or two but sometimes it stays sore throughout the day. She has occasionally passed out and fallen due to the pain, this has been happening since high school. She also has pain with intercourse and reports tearing/ ripping. She works at [employer] but will start a new [employer] job soon.  She reports frequent vaginal discharge that is not in a pattern. She also reports occasional numbness following intercourse.  -RS         Total Minutes    33945 - PT Therapeutic Activity Minutes 15  -RS         Exercise 1    Exercise Name 1 educaction- exam findings, POC, stretching introidus in shower 3x 60sec each position plus 60 sec sweeping  -RS            User Key  (r) = Recorded By, (t) = Taken By, (c) = Cosigned By    Initials Name Provider Type    RS Kamille Loza, PT Physical Therapist                                        Time Calculation:     Start Time: 1052  Stop Time: 1135  Time Calculation (min): 43 min  Timed Charges  05296 - PT Therapeutic Activity Minutes: 15  Total Minutes  Timed Charges Total Minutes: 15   Total Minutes: 15     Therapy Charges for Today     Code Description Service Date Service Provider Modifiers Qty    [id number]  PT THERAPEUTIC ACT EA 15 MIN 3/30/2023 Kamille Loza, PT GP 1    [id number]  PT EVAL MOD COMPLEXITY 2 3/30/2023 Kamille Loza, PT GP 1                    Kamille Loza PT  3/30/2023

## 2023-05-01 ENCOUNTER — HOSPITAL ENCOUNTER (OUTPATIENT)
Dept: PHYSICAL THERAPY | Facility: HOSPITAL | Age: 23
Setting detail: THERAPIES SERIES
Discharge: HOME | End: 2023-05-01
Payer: COMMERCIAL

## 2023-05-01 DIAGNOSIS — M54.50 LOW BACK PAIN, NON-SPECIFIC: ICD-10-CM

## 2023-05-01 DIAGNOSIS — M62.81 MUSCLE WEAKNESS: ICD-10-CM

## 2023-05-01 DIAGNOSIS — R10.2 PELVIC PAIN: Primary | ICD-10-CM

## 2023-05-01 PROCEDURE — 97110 THERAPEUTIC EXERCISES: CPT

## 2023-05-01 PROCEDURE — 97140 MANUAL THERAPY 1/> REGIONS: CPT

## 2023-05-01 PROCEDURE — 97530 THERAPEUTIC ACTIVITIES: CPT

## 2023-05-01 NOTE — THERAPY PROGRESS REPORT/RE-CERT
Outpatient Physical Therapy Pelvic Health Progress Note  [facility]     Patient Name: Delphine Hernandez  : 2000  MRN: [id number]  Today's Date: 2023        Visit Date: 2023    Visit Dx:    ICD-10-CM ICD-9-CM   1. Pelvic pain  R10.2 [id number]   2. Muscle weakness  M62.81 728.87   3. Low back pain, non-specific  M54.50 724.2       Patient Active Problem List   Diagnosis   • Asthma   • Anxiety   • Major depressive disorder with current active episode   • IUD (intrauterine device) in place: Mirena 7/15/22   • Female dyspareunia   • Screening for STD (sexually transmitted disease)   • Candida albicans infection   • Carrier of ureaplasma urealyticum   • Right ovarian cyst   • History of chlamydia   • Migraine with aura   • Chronic pelvic pain in female         Pelvic Health     Row Name 23 1400             Pelvic Floor Muscle    Patient/Parent/Guardian Consented to Internal Pelvic Floor Exam Yes  -CC         Education Provided On:    Education Points Vagina/rectal stretching;HEP;Self-Mobilization of soft tissue;Minimize Irritation to Vulvar Tissue with Handout;Home use of dilators in graduating sizes;Provides resources for patient  -CC      Method of Delivery Verbal;Demonstration;Written  -CC      Education Provided To Patient  -CC      Level of Understanding Verbalized  -CC      HEP Comments Access Code [email or web address]  -CC            User Key  (r) = Recorded By, (t) = Taken By, (c) = Cosigned By    Initials Name Provider Type    CC Chelsie Nieto, PT Physical Therapist                              PT Assessment/Plan     Row Name 23 1500          PT Assessment    Functional Limitations Other (comment);Performance in work activities;Performance in self-care ADL;Performance in leisure activities  -CC     Impairments Impaired flexibility;Impaired muscle endurance;Impaired muscle length;Impaired muscle power;Range of motion;Posture;Pain  -CC     Assessment Comments Delphine Hernandez has been seen  for 2 physical therapy sessions for pelvic pain.  Treatment has included therapeutic exercise, manual therapy, therapeutic activity and patient education with home exercise program . Progress to physical therapy goals is slow. Pt has met 0/2 STG and 0/3 LTG. Limited progress secondary to appointment availability and  pts schedule. Continues to demo decreased PFM mobility and all 3 layers, elevated PFM resting position, decreased PFM relaxation, decreased hip mobility, and general core/hip/PFM weakness. She will benefit from continued skilled physical therapy to address remaining impairments and functional limitations.  -CC     Please refer to paper survey for additional self-reported information No  -CC     Rehab Potential Good  -CC     Patient/caregiver participated in establishment of treatment plan and goals Yes  -CC     Patient would benefit from skilled therapy intervention Yes  -CC        PT Plan    PT Frequency 1x/week  -CC     Predicted Duration of Therapy Intervention (PT) 90 days, 10-12 sessions  -CC     PT Plan Comments Response to manual, did pt get pelvic wand and utilize? Cont with manual. Add supine butterfly, child's pose  -CC           User Key  (r) = Recorded By, (t) = Taken By, (c) = Cosigned By    Initials Name Provider Type    Chelsie Cagle, PT Physical Therapist                   OP Exercises     Row Name 05/01/23 1500             Subjective Comments    Subjective Comments Reports some difficulty with manual stretch due to does not feel comfortable performing on herself with her finger. Reports feels comfortable with manual treatment from PT or using dilator for self, but does not want to use own finger.  -CC         Total Minutes    48407 - PT Therapeutic Exercise Minutes 10  -CC      44368 - PT Therapeutic Activity Minutes 18  -CC      81965 - PT Manual Therapy Minutes 15  -CC         Exercise 1    Exercise Name 1 education on use of pelvic wand/dilators for introitus stretching   -CC         Exercise 2    Exercise Name 2 happy baby  -CC      Cueing 2 Verbal  -CC      Time 2 2 min  -CC      Additional Comments holding ankles  -CC         Exercise 3    Exercise Name 3 seated HS str  -CC      Cueing 3 Verbal  -CC      Reps 3 3 nadeem  -CC      Time 3 20 sec  -CC         Exercise 4    Exercise Name 4 diaphragmatic breathing  -CC      Cueing 4 Verbal  -CC      Sets 4 2  -CC      Reps 4 5  -CC      Additional Comments HL  -CC         Exercise 5    Exercise Name 5 education on recommendation for vulvar balm to help with dryness/avoid external fissures  -CC            User Key  (r) = Recorded By, (t) = Taken By, (c) = Cosigned By    Initials Name Provider Type    Chelsie Cagle, PT Physical Therapist              Manual Rx (last 36 hours)     Manual Treatments     Row Name 05/01/23 1500             Total Minutes    36670 - PT Manual Therapy Minutes 15  -CC         Manual Rx 1    Manual Rx 1 Location Introitus str  -CC      Manual Rx 1 Type 4,6,8 oclock sustained + sweeping U  -CC            User Key  (r) = Recorded By, (t) = Taken By, (c) = Cosigned By    Initials Name Provider Type    CC Chelsie Nieto, PT Physical Therapist                           PT OP Goals     Row Name 05/01/23 1500          PT Short Term Goals    STG Date to Achieve 05/14/23  -CC     STG 1 The pt will demonstrate IND and compliant with stretch at introidus on most days of the week to facilitate improved pelvic floor mobility at 1st layer.  -CC     STG 1 Progress Ongoing  -CC     STG 2 The pt will demonstrate IND with diaphragmatic breathing .  -CC     STG 2 Progress Ongoing  -CC        Long Term Goals    LTG Date to Achieve 06/28/23  -CC     LTG 1 The pt will report at least 60% reduction in frequency of sharp/stabbing abdominal pain during typical activities to facilitate improved functional activity tolerance.  -CC     LTG 1 Progress Ongoing  -CC     LTG 2 The pt will demonstrate full pelvic floor excursion to  indicate improved motor control of the pelvic floor.  -CC     LTG 2 Progress Ongoing  -CC     LTG 3 The pt will report at least 70% reduction in pain with intercourse to facilitate improved iADL performance.  -CC     LTG 3 Progress Ongoing  -CC           User Key  (r) = Recorded By, (t) = Taken By, (c) = Cosigned By    Initials Name Provider Type    CC Chelsie Nieto, PT Physical Therapist                                Time Calculation:   Start Time: 1345  Stop Time: 1428  Time Calculation (min): 43 min  Total Timed Code Minutes- PT: 43 minute(s)  Timed Charges  00074 - PT Therapeutic Exercise Minutes: 10  50118 - PT Manual Therapy Minutes: 15  43450 - PT Therapeutic Activity Minutes: 18  Total Minutes  Timed Charges Total Minutes: 43   Total Minutes: 43  Therapy Charges for Today     Code Description Service Date Service Provider Modifiers Qty    78560536189 HC PT THER PROC EA 15 MIN 5/1/2023 Chelsie Nieto, PT GP 1    65023310700 HC PT THERAPEUTIC ACT EA 15 MIN 5/1/2023 Chelsie Nieto, PT GP 1    70910315638 HC PT MANUAL THERAPY EA 15 MIN 5/1/2023 Chelsie Nieto, PT GP 1                    Chelsie Nieto PT  5/1/2023

## 2023-05-03 ENCOUNTER — APPOINTMENT (OUTPATIENT)
Dept: PHYSICAL THERAPY | Facility: HOSPITAL | Age: 23
End: 2023-05-03
Payer: COMMERCIAL

## 2023-05-10 ENCOUNTER — APPOINTMENT (OUTPATIENT)
Dept: PHYSICAL THERAPY | Facility: HOSPITAL | Age: 23
End: 2023-05-10
Payer: COMMERCIAL

## 2023-05-17 ENCOUNTER — APPOINTMENT (OUTPATIENT)
Dept: PHYSICAL THERAPY | Facility: HOSPITAL | Age: 23
End: 2023-05-17
Payer: COMMERCIAL

## 2023-05-22 ENCOUNTER — HOSPITAL ENCOUNTER (OUTPATIENT)
Dept: PHYSICAL THERAPY | Facility: HOSPITAL | Age: 23
Setting detail: THERAPIES SERIES
Discharge: HOME | End: 2023-05-22
Payer: COMMERCIAL

## 2023-05-22 DIAGNOSIS — M62.81 MUSCLE WEAKNESS: ICD-10-CM

## 2023-05-22 DIAGNOSIS — R10.2 PELVIC PAIN: Primary | ICD-10-CM

## 2023-05-22 DIAGNOSIS — M54.50 LOW BACK PAIN, NON-SPECIFIC: ICD-10-CM

## 2023-05-22 PROCEDURE — 97110 THERAPEUTIC EXERCISES: CPT

## 2023-05-22 PROCEDURE — 97530 THERAPEUTIC ACTIVITIES: CPT

## 2023-05-22 NOTE — THERAPY TREATMENT NOTE
Outpatient Physical Therapy Pelvic Health Treatment Note  [facility]     Patient Name: Delphine Hernandez  : 2000  MRN: [id number]  Today's Date: 2023        Visit Date: 2023    Visit Dx:    ICD-10-CM ICD-9-CM   1. Pelvic pain  R10.2 ECQ2622   2. Muscle weakness  M62.81 728.87   3. Low back pain, non-specific  M54.50 724.2       Patient Active Problem List   Diagnosis   • Asthma   • Anxiety   • Major depressive disorder with current active episode   • IUD (intrauterine device) in place: Mirena 7/15/22   • Female dyspareunia   • Screening for STD (sexually transmitted disease)   • Candida albicans infection   • Carrier of ureaplasma urealyticum   • Right ovarian cyst   • History of chlamydia   • Migraine with aura   • Chronic pelvic pain in female                          PT Assessment/Plan     Row Name 23 1400          PT Assessment    Assessment Comments Delphine Hernandez has been seen for 3 physical therapy sessions for pelvic pain.  Treatment has included therapeutic exercise, manual therapy, therapeutic activity and patient education with home exercise program . Progress to physical therapy goals is fair, secondary to limited visits due to clinic scheduling availability and HEP compliance. Pt has partially met 1/2 STG and met 1/3 LTG. Pt continues to report random instances of pelvic and abdominal pain that is debilitating, but decreased frequency of these episodes since starting PF PT. She continues to have decreased PFM coordination and decreased relaxation, as well as ongoing issues with vulvar dryness contributing to external vulvar fissures. She will benefit from continued skilled physical therapy to address remaining impairments and functional limitations.  -CC        PT Plan    PT Plan Comments Response to manual? May consider adding deep squat with breathing, child's pose, qped with IR, mermaid. Next 1-2 visits, consider PPt and HL hip add.  -CC           User Key  (r) = Recorded  By, (t) = Taken By, (c) = Cosigned By    Initials Name Provider Type    CC Chelsie Nieto, PT Physical Therapist                   OP Exercises     Row Name 05/22/23 1400             Subjective Comments    Subjective Comments Reports decreased instances of pelvic pain episodes since previous visit. Hasn't purchases pelvic wand, has been getting more comfortable with manual stretches.  Hasn't tried vulvar balm yet.  -CC         Total Minutes    01363 - PT Therapeutic Exercise Minutes 20  -CC      97837 - PT Therapeutic Activity Minutes 20  -CC         Exercise 1    Exercise Name 1 education on use of water-based lubricant during sexual intercourse/fingering to avoid irritation to vulvar tissues, review of manual techniques for vaginal mobility  -CC         Exercise 2    Exercise Name 2 happy baby  -CC      Cueing 2 Verbal  -CC      Time 2 2 min  -CC      Additional Comments holding ankles  -CC         Exercise 3    Exercise Name 3 seated HS str  -CC      Cueing 3 Verbal  -CC      Reps 3 3 nadeem  -CC      Time 3 20 sec  -CC         Exercise 4    Exercise Name 4 diaphragmatic breathing  -CC      Cueing 4 Verbal  -CC      Sets 4 2  -CC      Reps 4 5  -CC      Time 4 second set with PF lengthening  -CC      Additional Comments HL  -CC         Exercise 5    Exercise Name 5 education on recommendation for vulvar balm to help with dryness/avoid external fissures  -CC         Exercise 6    Exercise Name 6 supine butterfly  -CC      Cueing 6 Verbal  -CC      Sets 6 2  -CC      Reps 6 5 deep breaths  -CC      Additional Comments no overpressure  -CC         Exercise 7    Exercise Name 7 qped rock back  -CC      Cueing 7 Verbal  -CC      Reps 7 5  -CC      Time 7 15 sec  -CC      Additional Comments neutral hip; too uncomfortable with hips in nadeem IR  -CC            User Key  (r) = Recorded By, (t) = Taken By, (c) = Cosigned By    Initials Name Provider Type    CC Chelsie Nieto, PT Physical Therapist                              PT OP Goals     Row Name 05/22/23 1400          PT Short Term Goals    STG Date to Achieve 05/14/23  -CC     STG 1 The pt will demonstrate IND and compliant with stretch at introidus on most days of the week to facilitate improved pelvic floor mobility at 1st layer.  -CC     STG 1 Progress Partially Met  -CC     STG 2 The pt will demonstrate IND with diaphragmatic breathing .  -CC     STG 2 Progress Met  -CC        Long Term Goals    LTG Date to Achieve 06/28/23  -CC     LTG 1 The pt will report at least 60% reduction in frequency of sharp/stabbing abdominal pain during typical activities to facilitate improved functional activity tolerance.  -CC     LTG 1 Progress Ongoing  -CC     LTG 1 Progress Comments has decreased aabout 20%  -CC     LTG 2 The pt will demonstrate full pelvic floor excursion to indicate improved motor control of the pelvic floor.  -CC     LTG 2 Progress Ongoing  -CC     LTG 3 The pt will report at least 70% reduction in pain with intercourse to facilitate improved iADL performance.  -CC     LTG 3 Progress Met  -CC           User Key  (r) = Recorded By, (t) = Taken By, (c) = Cosigned By    Initials Name Provider Type    CC Chelsie Nieto, PT Physical Therapist                                Time Calculation:   Start Time: 1430  Stop Time: 1510  Time Calculation (min): 40 min  Total Timed Code Minutes- PT: 40 minute(s)  Timed Charges  05632 - PT Therapeutic Exercise Minutes: 20  94457 - PT Therapeutic Activity Minutes: 20  Total Minutes  Timed Charges Total Minutes: 40   Total Minutes: 40  Therapy Charges for Today     Code Description Service Date Service Provider Modifiers Qty    06220247897  PT THER PROC EA 15 MIN 5/22/2023 Chelsie Nieto, PT GP 2    [id number]  PT THERAPEUTIC ACT EA 15 MIN 5/22/2023 Chelsie Nieto, PT GP 1                    Chelsie Nieto PT  5/22/2023

## 2023-05-24 ENCOUNTER — APPOINTMENT (OUTPATIENT)
Dept: PHYSICAL THERAPY | Facility: HOSPITAL | Age: 23
End: 2023-05-24
Payer: COMMERCIAL

## 2023-08-07 ENCOUNTER — OFFICE VISIT (OUTPATIENT)
Dept: FAMILY MEDICINE CLINIC | Facility: CLINIC | Age: 23
End: 2023-08-07
Payer: COMMERCIAL

## 2023-08-07 VITALS
HEIGHT: 70 IN | TEMPERATURE: 98 F | HEART RATE: 86 BPM | SYSTOLIC BLOOD PRESSURE: 110 MMHG | DIASTOLIC BLOOD PRESSURE: 60 MMHG | OXYGEN SATURATION: 99 % | BODY MASS INDEX: 20.52 KG/M2

## 2023-08-07 DIAGNOSIS — B37.9 YEAST INFECTION: Primary | ICD-10-CM

## 2023-08-07 PROCEDURE — 99213 OFFICE O/P EST LOW 20 MIN: CPT | Performed by: NURSE PRACTITIONER

## 2023-08-07 RX ORDER — FLUCONAZOLE 150 MG/1
TABLET ORAL
Qty: 2 TABLET | Refills: 0 | Status: SHIPPED | OUTPATIENT
Start: 2023-08-07

## 2023-08-07 NOTE — PROGRESS NOTES
Patient ID: Delphine Hernandez is a 23 y.o. adult     Patient Care Team:  Head, SHAREE Nieves as PCP - General (Nurse Practitioner)  Leroy Vasquez MD as Consulting Physician (Gastroenterology)    Subjective     Chief Complaint   Patient presents with    Vaginal Itching       History of Present Illness    Delphine Hernandez presents to [facility] today for complaints of vaginal irritation.     Vaginal itching and dry skin.  No worsening vaginal discharge.    Labia burns after urination.  Red and swollen at times.   Onset 1 week ago.   No known STD exposure.    Currently undergoing PT for pelvic floor dysfunction.  She also suffers from tears to vaginal area due to thin skin.  Has a steroid cream she uses as needed.      IGP,CtNgTv,rfx Aptima HPV ASCU (07/15/2021 09:36)    Delphine Hernandez denies any complaints of fever, chills, cough, chest pain, shortness of air, abdominal pain, nausea, or any other concerns.     The following portions of the patient's history were reviewed and updated as appropriate: allergies, current medications, past family history, past medical history, past social history, past surgical history and problem list.       ROS    Vitals:    08/07/23 0757   BP: 110/60   Pulse: 86   Temp: 98 øF (36.7 øC)   SpO2: 99%       There were no vitals filed for this visit.  Body mass index is 20.52 kg/mý.        Objective     Physical Exam  Vitals reviewed.   Constitutional:       Appearance: Normal appearance. Delphine Hernandez is not ill-appearing.   Cardiovascular:      Rate and Rhythm: Normal rate.   Pulmonary:      Effort: Pulmonary effort is normal.   Neurological:      Mental Status: Delphine Hernandez is alert and oriented to person, place, and time.   Psychiatric:         Mood and Affect: Mood normal.          Assessment & Plan     Assessment/Plan     Diagnoses and all orders for this visit:    1. Yeast infection (Primary)  -     fluconazole (Diflucan) 150 MG tablet; Take X 1  dose now.  Repeat in 72 hours if no improvement of symptoms.  Dispense: 2 tablet; Refill: 0  -     nystatin-triamcinolone (MYCOLOG II) 033608-6.1 UNIT/GM-% cream; Apply 1 application  topically to the appropriate area as directed 2 (Two) Times a Day.  Dispense: 30 g; Refill: 0      Follow Up:  Return if symptoms worsen or fail to improve.    Patient was given instructions and counseling regarding condition or for health maintenance advice.  Please see specific information pulled into the AVS if appropriate.          Mackenzie Ndiaye, APRN  Family Medicine  [facility] [place] [facility]  08/07/23  08:51 EDT

## 2023-08-22 ENCOUNTER — TELEPHONE (OUTPATIENT)
Dept: FAMILY MEDICINE CLINIC | Facility: CLINIC | Age: 23
End: 2023-08-22

## 2023-08-22 ENCOUNTER — OFFICE VISIT (OUTPATIENT)
Dept: FAMILY MEDICINE CLINIC | Facility: CLINIC | Age: 23
End: 2023-08-22
Payer: COMMERCIAL

## 2023-08-22 VITALS
BODY MASS INDEX: 20.52 KG/M2 | DIASTOLIC BLOOD PRESSURE: 66 MMHG | HEART RATE: 89 BPM | OXYGEN SATURATION: 97 % | SYSTOLIC BLOOD PRESSURE: 120 MMHG | HEIGHT: 70 IN | TEMPERATURE: 98.4 F

## 2023-08-22 DIAGNOSIS — R00.2 HEART PALPITATIONS: ICD-10-CM

## 2023-08-22 DIAGNOSIS — R11.2 NAUSEA AND VOMITING, UNSPECIFIED VOMITING TYPE: ICD-10-CM

## 2023-08-22 DIAGNOSIS — R53.83 FATIGUE, UNSPECIFIED TYPE: ICD-10-CM

## 2023-08-22 DIAGNOSIS — R51.9 FREQUENT HEADACHES: ICD-10-CM

## 2023-08-22 DIAGNOSIS — R20.2 NUMBNESS AND TINGLING: ICD-10-CM

## 2023-08-22 DIAGNOSIS — R55 SYNCOPE, UNSPECIFIED SYNCOPE TYPE: Primary | ICD-10-CM

## 2023-08-22 DIAGNOSIS — R20.0 NUMBNESS AND TINGLING: ICD-10-CM

## 2023-08-22 PROCEDURE — 99214 OFFICE O/P EST MOD 30 MIN: CPT | Performed by: NURSE PRACTITIONER

## 2023-08-22 PROCEDURE — 93000 ELECTROCARDIOGRAM COMPLETE: CPT | Performed by: NURSE PRACTITIONER

## 2023-08-22 RX ORDER — ONDANSETRON 4 MG/1
4 TABLET, ORALLY DISINTEGRATING ORAL EVERY 8 HOURS PRN
Qty: 20 TABLET | Refills: 0 | Status: SHIPPED | OUTPATIENT
Start: 2023-08-22

## 2023-08-22 NOTE — LETTER
August 22, 2023     Patient: Delphine Hernandez   YOB: 2000   Date of Visit: 8/22/2023       To Whom It May Concern:    Delphine Hernandez was seen in office today for acute illness. It is my medical opinion that Delphine Hernandez may return to work on Thursday, August 24, 2023 as long as symptoms improved.           Sincerely,        Mackenzie Ndiaye, APRN    CC: No Recipients

## 2023-08-22 NOTE — TELEPHONE ENCOUNTER
Caller: Delphine Hernandez    Relationship to patient: Self    Best call back number: [phone]    Patient is needing:     WANTED TO LET REE HEAD KNOW THAT SHE DID THINK ABOUT IT, AND LET HER KNOW THAT SHE DOES HAVE A HISTORY OF LOW IRON.

## 2023-08-22 NOTE — TELEPHONE ENCOUNTER
Caller: Delphine Hernandez    Relationship: Self    Best call back number: [phone]    What orders are you requesting (i.e. lab or imaging):     MRI OR CT     LABS- TO CHECK FOR AUTOIMMUNE DISEASE    In what timeframe would the patient need to come in: ASAP    Where will you receive your lab/imaging services:     [facility]    Additional notes:     PLEASE CALL TO CONFIRM WITH PATIENT.

## 2023-08-22 NOTE — TELEPHONE ENCOUNTER
Pt aware labs will try to be added on, also will try for a MRI unless insurance denies will try for CT scan.

## 2023-08-22 NOTE — PROGRESS NOTES
Patient ID: Delphine Hernandez is a 23 y.o. adult     Patient Care Team:  Head, SHAREE Nieves as PCP - General (Nurse Practitioner)  Leroy Vasquez MD as Consulting Physician (Gastroenterology)    Subjective     Chief Complaint   Patient presents with    Nausea     Sent home from work more recently    Headache    Loss of Consciousness     Did pass out the other day, not today. Lost vision for a second     Chills     Sweating        History of Present Illness    Delphine Hernandez presents to [facility] today for reoccurring symptoms of syncopal episodes with headache.        Near syncope.  Occurs on daily basis.  Initially started around November 2022. Initially thought related to POTS. Had seen cardiology.  Underwent Holter and echo which were stable.    LOC Thursday and Friday.  Lasted less than 1 minute. Girlfriend was there.    Had another episode yesterday after hot shower.   Headache:  Frontal from temple to temple.  Rates pain 5-6 on 0-10 scale.  States most medications she has tried in the past for headaches made symptoms worse.   Nausea is an 8 on 0-10 scale.   Vomiting X 4 episodes 0730 this am.    Chills and sweating.   No fever that she is aware of.    Has not been around anyone with similar symptoms.    Complains of joint pain, fatigue, and numbness and tingling especially in lower extremities.      Delphine Hernandez denies any complaints of fever, cough, chest pain, shortness of air, abdominal pain, or any other concerns.     The following portions of the patient's history were reviewed and updated as appropriate: allergies, current medications, past family history, past medical history, past social history, past surgical history and problem list.       ROS    Vitals:    08/22/23 1020   BP:    Pulse:    Temp:    SpO2: 97%     Vitals:    08/22/23 0946 08/22/23 1018 08/22/23 1019 08/22/23 1020   Orthostatic BP:  118/70 116/74 116/70   Orthostatic Pulse:  84 101 77   Patient  Position: Sitting Sitting Standing Lying       There were no vitals filed for this visit.  Body mass index is 20.52 kg/mý.        Data reviewed : :  SCANNED EKG (11/03/2022)    Holter monitor - 48 hour (11/16/2022 08:29)    Adult Transthoracic Echo Complete W/ Cont if Necessary Per Protocol (11/16/2022 08:51)  Objective     Physical Exam  Vitals reviewed.   Constitutional:       General: Delphine Hernandez is not in acute distress.  HENT:      Head: Normocephalic and atraumatic.      Right Ear: Tympanic membrane normal.      Left Ear: Tympanic membrane normal.   Eyes:      Extraocular Movements: Extraocular movements intact.      Pupils: Pupils are equal, round, and reactive to light.   Cardiovascular:      Rate and Rhythm: Normal rate and regular rhythm.      Heart sounds: No murmur heard.  Pulmonary:      Effort: Pulmonary effort is normal.      Breath sounds: Normal breath sounds. No wheezing.   Musculoskeletal:      Cervical back: Normal range of motion.      Right lower leg: No edema.      Left lower leg: No edema.   Skin:     Findings: No rash.   Neurological:      Mental Status: Delphine Hernandez is alert and oriented to person, place, and time.      Cranial Nerves: No cranial nerve deficit.      Motor: No weakness.      Gait: Gait normal.             ECG 12 Lead    Date/Time: 8/22/2023 11:02 AM  Performed by: Mackenzie Ndiaye APRN  Authorized by: Mackenzie Ndiaye APRN   Comparison: compared with previous ECG from 11/3/2022  Similar to previous ECG  Rhythm: sinus rhythm  Rate: normal  Conduction: conduction normal  ST Segments: ST segments normal  T Waves: T waves normal  QRS axis: normal  Other: no other findings    Clinical impression: normal ECG      Assessment & Plan     Assessment/Plan     Diagnoses and all orders for this visit:    1. Syncope, unspecified syncope type (Primary)  -     CBC (No Diff)  -     Comprehensive Metabolic Panel  -     TSH Rfx On Abnormal To Free T4  -     Magnesium  -     Iron and TIBC  -      MRI Brain With & Without Contrast; Future    2. Heart palpitations  -     ECG 12 Lead  -     CBC (No Diff)  -     Comprehensive Metabolic Panel  -     TSH Rfx On Abnormal To Free T4  -     Magnesium    3. Nausea and vomiting, unspecified vomiting type  -     ondansetron ODT (ZOFRAN-ODT) 4 MG disintegrating tablet; Place 1 tablet on the tongue Every 8 (Eight) Hours As Needed for Nausea or Vomiting.  Dispense: 20 tablet; Refill: 0  -     CBC (No Diff)  -     Comprehensive Metabolic Panel  -     TSH Rfx On Abnormal To Free T4  -     Magnesium    4. Frequent headaches  -     MRI Brain With & Without Contrast; Future    5. Numbness and tingling  -     MRI Brain With & Without Contrast; Future    6. Fatigue, unspecified type      EKG and orthostatics stable in office today.  Will obtain labs for further evaluation.  Due to stable cardiac work-up concerning syncopal episodes, will need to proceed with MRI brain for further evaluation.      In the meantime, instructed to contact us sooner for any problems or concerns.    Follow Up:    Return for Labs today.  F/U prn.  .    Patient was given instructions and counseling regarding condition or for health maintenance advice.  Please see specific information pulled into the AVS if appropriate.          Mackenzie Ndiaye, APRN  Family Medicine  [facility] [place] [facility]  08/22/23  17:16 EDT

## 2023-08-23 ENCOUNTER — TELEPHONE (OUTPATIENT)
Dept: FAMILY MEDICINE CLINIC | Facility: CLINIC | Age: 23
End: 2023-08-23

## 2023-08-23 LAB
ALBUMIN SERPL-MCNC: 4.9 G/DL (ref 3.5–5.2)
ALBUMIN/GLOB SERPL: 2.2 G/DL
ALP SERPL-CCNC: 87 U/L (ref 39–117)
ALT SERPL-CCNC: 12 U/L (ref 1–33)
AST SERPL-CCNC: 18 U/L (ref 1–32)
BILIRUB SERPL-MCNC: 0.6 MG/DL (ref 0–1.2)
BUN SERPL-MCNC: 9 MG/DL (ref 6–20)
BUN/CREAT SERPL: 13 (ref 7–25)
CALCIUM SERPL-MCNC: 9.6 MG/DL (ref 8.6–10.5)
CHLORIDE SERPL-SCNC: 106 MMOL/L (ref 98–107)
CO2 SERPL-SCNC: 24.7 MMOL/L (ref 22–29)
CREAT SERPL-MCNC: 0.69 MG/DL (ref 0.57–1)
EGFRCR SERPLBLD CKD-EPI 2021: 125.2 ML/MIN/1.73
ERYTHROCYTE [DISTWIDTH] IN BLOOD BY AUTOMATED COUNT: 12.5 % (ref 12.3–15.4)
GLOBULIN SER CALC-MCNC: 2.2 GM/DL
GLUCOSE SERPL-MCNC: 83 MG/DL (ref 65–99)
HCT VFR BLD AUTO: 43.2 % (ref 34–46.6)
HGB BLD-MCNC: 14.6 G/DL (ref 12–15.9)
IRON SATN MFR SERPL: 39 % (ref 20–50)
IRON SERPL-MCNC: 141 MCG/DL (ref 37–145)
MAGNESIUM SERPL-MCNC: 1.9 MG/DL (ref 1.6–2.6)
MCH RBC QN AUTO: 29.6 PG (ref 26.6–33)
MCHC RBC AUTO-ENTMCNC: 33.8 G/DL (ref 31.5–35.7)
MCV RBC AUTO: 87.6 FL (ref 79–97)
PLATELET # BLD AUTO: 226 10*3/MM3 (ref 140–450)
POTASSIUM SERPL-SCNC: 4.5 MMOL/L (ref 3.5–5.2)
PROT SERPL-MCNC: 7.1 G/DL (ref 6–8.5)
RBC # BLD AUTO: 4.93 10*6/MM3 (ref 3.77–5.28)
SODIUM SERPL-SCNC: 139 MMOL/L (ref 136–145)
TIBC SERPL-MCNC: 362 MCG/DL
TSH SERPL DL<=0.005 MIU/L-ACNC: 0.78 UIU/ML (ref 0.27–4.2)
UIBC SERPL-MCNC: 221 MCG/DL (ref 112–346)
WBC # BLD AUTO: 5.75 10*3/MM3 (ref 3.4–10.8)

## 2023-08-23 NOTE — TELEPHONE ENCOUNTER
Caller: Delphine Hernandez    Relationship: Self    Best call back number:   Delphine Hernandez (Self) [phone] (Mobile)       Caller requesting test results:     What test was performed: LABS    When was the test performed: YESTERDAY     Where was the test performed: OFFICE     Additional notes:       CAN SHE TAKE ZOFRAN WITH TYLENOL OR EXCEDRIN OVER THE COUNTER

## 2023-08-24 ENCOUNTER — HOSPITAL ENCOUNTER (EMERGENCY)
Facility: HOSPITAL | Age: 23
Discharge: HOME | End: 2023-08-24
Attending: EMERGENCY MEDICINE
Payer: COMMERCIAL

## 2023-08-24 ENCOUNTER — APPOINTMENT (OUTPATIENT)
Dept: CT IMAGING | Facility: HOSPITAL | Age: 23
End: 2023-08-24
Payer: COMMERCIAL

## 2023-08-24 VITALS
HEART RATE: 76 BPM | RESPIRATION RATE: 16 BRPM | DIASTOLIC BLOOD PRESSURE: 63 MMHG | BODY MASS INDEX: 22.06 KG/M2 | SYSTOLIC BLOOD PRESSURE: 104 MMHG | HEIGHT: 70 IN | TEMPERATURE: 98.2 F | WEIGHT: 154.1 LBS | OXYGEN SATURATION: 97 %

## 2023-08-24 DIAGNOSIS — G43.719 INTRACTABLE CHRONIC MIGRAINE WITHOUT AURA AND WITHOUT STATUS MIGRAINOSUS: Primary | ICD-10-CM

## 2023-08-24 LAB
ALBUMIN SERPL-MCNC: 4.5 G/DL (ref 3.5–5.2)
ALBUMIN/GLOB SERPL: 1.4 G/DL
ALP SERPL-CCNC: 86 U/L (ref 39–117)
ALT SERPL W P-5'-P-CCNC: 9 U/L (ref 1–33)
ANA SER QL: NEGATIVE
ANION GAP SERPL CALCULATED.3IONS-SCNC: 8.6 MMOL/L (ref 5–15)
AST SERPL-CCNC: 15 U/L (ref 1–32)
BACTERIA UR QL AUTO: ABNORMAL /HPF
BASOPHILS # BLD AUTO: 0 10*3/MM3 (ref 0–0.2)
BASOPHILS NFR BLD AUTO: 0 % (ref 0–1.5)
BILIRUB SERPL-MCNC: 0.6 MG/DL (ref 0–1.2)
BILIRUB UR QL STRIP: NEGATIVE
BUN SERPL-MCNC: 9 MG/DL (ref 6–20)
BUN/CREAT SERPL: 11.1 (ref 7–25)
CALCIUM SPEC-SCNC: 9.5 MG/DL (ref 8.6–10.5)
CHLORIDE SERPL-SCNC: 106 MMOL/L (ref 98–107)
CLARITY UR: CLEAR
CO2 SERPL-SCNC: 24.4 MMOL/L (ref 22–29)
COLOR UR: YELLOW
CREAT SERPL-MCNC: 0.81 MG/DL (ref 0.57–1)
DEPRECATED RDW RBC AUTO: 41 FL (ref 37–54)
EGFRCR SERPLBLD CKD-EPI 2021: 104.8 ML/MIN/1.73
EOSINOPHIL # BLD AUTO: 0.03 10*3/MM3 (ref 0–0.4)
EOSINOPHIL NFR BLD AUTO: 0.4 % (ref 0.3–6.2)
ERYTHROCYTE [DISTWIDTH] IN BLOOD BY AUTOMATED COUNT: 12.3 % (ref 12.3–15.4)
GLOBULIN UR ELPH-MCNC: 3.2 GM/DL
GLUCOSE SERPL-MCNC: 91 MG/DL (ref 65–99)
GLUCOSE UR STRIP-MCNC: NEGATIVE MG/DL
HCG SERPL QL: NEGATIVE
HCT VFR BLD AUTO: 44.7 % (ref 34–46.6)
HGB BLD-MCNC: 14.6 G/DL (ref 12–15.9)
HGB UR QL STRIP.AUTO: ABNORMAL
HYALINE CASTS UR QL AUTO: ABNORMAL /LPF
IMM GRANULOCYTES # BLD AUTO: 0 10*3/MM3 (ref 0–0.05)
IMM GRANULOCYTES NFR BLD AUTO: 0 % (ref 0–0.5)
KETONES UR QL STRIP: NEGATIVE
LEUKOCYTE ESTERASE UR QL STRIP.AUTO: ABNORMAL
LIPASE SERPL-CCNC: 15 U/L (ref 13–60)
LYMPHOCYTES # BLD AUTO: 2.2 10*3/MM3 (ref 0.7–3.1)
LYMPHOCYTES NFR BLD AUTO: 32.1 % (ref 19.6–45.3)
MCH RBC QN AUTO: 29.4 PG (ref 26.6–33)
MCHC RBC AUTO-ENTMCNC: 32.7 G/DL (ref 31.5–35.7)
MCV RBC AUTO: 89.9 FL (ref 79–97)
MONOCYTES # BLD AUTO: 0.42 10*3/MM3 (ref 0.1–0.9)
MONOCYTES NFR BLD AUTO: 6.1 % (ref 5–12)
NEUTROPHILS NFR BLD AUTO: 4.21 10*3/MM3 (ref 1.7–7)
NEUTROPHILS NFR BLD AUTO: 61.4 % (ref 42.7–76)
NITRITE UR QL STRIP: NEGATIVE
PH UR STRIP.AUTO: 7.5 [PH] (ref 4.5–8)
PLATELET # BLD AUTO: 227 10*3/MM3 (ref 140–450)
PMV BLD AUTO: 10.5 FL (ref 6–12)
POTASSIUM SERPL-SCNC: 4.3 MMOL/L (ref 3.5–5.2)
PROT SERPL-MCNC: 7.7 G/DL (ref 6–8.5)
PROT UR QL STRIP: NEGATIVE
RBC # BLD AUTO: 4.97 10*6/MM3 (ref 3.77–5.28)
RBC # UR STRIP: ABNORMAL /HPF
REF LAB TEST METHOD: ABNORMAL
SODIUM SERPL-SCNC: 139 MMOL/L (ref 136–145)
SP GR UR STRIP: 1.01 (ref 1–1.03)
SQUAMOUS #/AREA URNS HPF: ABNORMAL /HPF
UROBILINOGEN UR QL STRIP: ABNORMAL
WBC # UR STRIP: ABNORMAL /HPF
WBC NRBC COR # BLD: 6.86 10*3/MM3 (ref 3.4–10.8)
WRITTEN AUTHORIZATION: NORMAL

## 2023-08-24 PROCEDURE — 25010000002 PROCHLORPERAZINE 10 MG/2ML SOLUTION: Performed by: EMERGENCY MEDICINE

## 2023-08-24 PROCEDURE — 80053 COMPREHEN METABOLIC PANEL: CPT | Performed by: EMERGENCY MEDICINE

## 2023-08-24 PROCEDURE — 81001 URINALYSIS AUTO W/SCOPE: CPT | Performed by: EMERGENCY MEDICINE

## 2023-08-24 PROCEDURE — 96374 THER/PROPH/DIAG INJ IV PUSH: CPT

## 2023-08-24 PROCEDURE — 96375 TX/PRO/DX INJ NEW DRUG ADDON: CPT

## 2023-08-24 PROCEDURE — 83690 ASSAY OF LIPASE: CPT | Performed by: EMERGENCY MEDICINE

## 2023-08-24 PROCEDURE — 99284 EMERGENCY DEPT VISIT MOD MDM: CPT

## 2023-08-24 PROCEDURE — 25010000002 DIPHENHYDRAMINE PER 50 MG: Performed by: EMERGENCY MEDICINE

## 2023-08-24 PROCEDURE — 25010000002 DEXAMETHASONE PER 1 MG: Performed by: EMERGENCY MEDICINE

## 2023-08-24 PROCEDURE — 70450 CT HEAD/BRAIN W/O DYE: CPT

## 2023-08-24 PROCEDURE — 84703 CHORIONIC GONADOTROPIN ASSAY: CPT | Performed by: EMERGENCY MEDICINE

## 2023-08-24 PROCEDURE — 85025 COMPLETE CBC W/AUTO DIFF WBC: CPT | Performed by: EMERGENCY MEDICINE

## 2023-08-24 RX ORDER — PROCHLORPERAZINE MALEATE 10 MG
10 TABLET ORAL EVERY 6 HOURS PRN
Qty: 10 TABLET | Refills: 0 | Status: SHIPPED | OUTPATIENT
Start: 2023-08-24

## 2023-08-24 RX ORDER — DIPHENHYDRAMINE HYDROCHLORIDE 50 MG/ML
25 INJECTION INTRAMUSCULAR; INTRAVENOUS ONCE
Status: COMPLETED | OUTPATIENT
Start: 2023-08-24 | End: 2023-08-24

## 2023-08-24 RX ORDER — SODIUM CHLORIDE 0.9 % (FLUSH) 0.9 %
10 SYRINGE (ML) INJECTION AS NEEDED
Status: DISCONTINUED | OUTPATIENT
Start: 2023-08-24 | End: 2023-08-24 | Stop reason: HOSPADM

## 2023-08-24 RX ORDER — PROCHLORPERAZINE EDISYLATE 5 MG/ML
5 INJECTION INTRAMUSCULAR; INTRAVENOUS EVERY 6 HOURS PRN
Status: DISCONTINUED | OUTPATIENT
Start: 2023-08-24 | End: 2023-08-24 | Stop reason: HOSPADM

## 2023-08-24 RX ORDER — DEXAMETHASONE SODIUM PHOSPHATE 10 MG/ML
10 INJECTION INTRAMUSCULAR; INTRAVENOUS ONCE
Status: COMPLETED | OUTPATIENT
Start: 2023-08-24 | End: 2023-08-24

## 2023-08-24 RX ADMIN — SODIUM CHLORIDE 1000 ML: 9 INJECTION, SOLUTION INTRAVENOUS at 11:24

## 2023-08-24 RX ADMIN — DEXAMETHASONE SODIUM PHOSPHATE 10 MG: 10 INJECTION INTRAMUSCULAR; INTRAVENOUS at 11:27

## 2023-08-24 RX ADMIN — DIPHENHYDRAMINE HYDROCHLORIDE 25 MG: 50 INJECTION, SOLUTION INTRAMUSCULAR; INTRAVENOUS at 11:26

## 2023-08-24 RX ADMIN — PROCHLORPERAZINE EDISYLATE 5 MG: 5 INJECTION INTRAMUSCULAR; INTRAVENOUS at 11:25

## 2023-08-24 NOTE — ED PROVIDER NOTES
"Subjective     History provided by:  Patient and medical records  History of Present Illness    Chief complaint: Headache    Location: Across her forehead and behind her eyes as well as the base of her occipital area.    Quality/Severity: Pain is described as \"tight, pressure and squeezing and throbbing.    Timing/Onset: This particular headache started Monday (3 days ago).  She has had similar \"migraine headaches\" since she was in grade school.    Modifying Factors: Light and noise exacerbates the pain.    Associated symptoms: Associated nausea and vomiting for which she took Zofran at 7:30 this morning.    Narrative: The patient is a 23-year-old white female complaining of a 3-day history of one of her typical migraine headaches.  She has had these migraines since she was in elementary school.  She has never been evaluated by neurology.  She also has a history of POTS for which she has had cardiac work-up.  She states she has missed work for the last 3 days due to the headaches.  She seen her PCP yesterday who is ordered an MRI.    Review of Systems    Past Medical History:   Diagnosis Date    Anxiety     Asthma     Chronic nausea     Depression     Hx of migraines        Allergies   Allergen Reactions    Pepcid [Famotidine] Hives       Past Surgical History:   Procedure Laterality Date    DIAGNOSTIC LAPAROSCOPY N/A 9/22/2022    Procedure: DIAGNOSTIC LAPAROSCOPY;  Surgeon: Jamil Prado MD;  Location: [facility] OR;  Service: Obstetrics/Gynecology;  Laterality: N/A;       Family History   Problem Relation Age of Onset    Asthma Mother     Depression Mother     Anxiety disorder Mother     Asthma Maternal Grandmother     Depression Maternal Grandmother     Arthritis Maternal Grandmother     Colon cancer Neg Hx     Colon polyps Neg Hx     Crohn's disease Neg Hx     Ulcerative colitis Neg Hx     Irritable bowel syndrome Neg Hx        Social History     Socioeconomic History    Marital status: Single "   Tobacco Use    Smoking status: Never    Smokeless tobacco: Never   Vaping Use    Vaping Use: Some days    Substances: THC, CBD, Flavoring    Devices: Disposable, Pre-filled or refillable cartridge   Substance and Sexual Activity    Alcohol use: Yes     Comment: social    Drug use: Yes     Types: Marijuana     Comment: occ    Sexual activity: Not Currently     Partners: Male     Birth control/protection: I.U.D.           Objective   Physical Exam  Vitals and nursing note reviewed.   Constitutional:       General: Delphine Hernandez is not in acute distress.     Appearance: Delphine Hernandez is well-developed. Delphine Hernandez is not diaphoretic.   HENT:      Head: Normocephalic and atraumatic.      Nose: Nose normal.      Mouth/Throat:      Mouth: Mucous membranes are moist.      Pharynx: Oropharynx is clear.   Eyes:      General: No scleral icterus.        Right eye: No discharge.         Left eye: No discharge.      Conjunctiva/sclera: Conjunctivae normal.      Pupils: Pupils are equal, round, and reactive to light.   Neck:      Thyroid: No thyromegaly.      Vascular: No JVD.   Cardiovascular:      Rate and Rhythm: Normal rate and regular rhythm.      Heart sounds: Normal heart sounds. No murmur heard.  Pulmonary:      Effort: Pulmonary effort is normal.      Breath sounds: Normal breath sounds. No wheezing, rhonchi or rales.   Chest:      Chest wall: No tenderness.   Abdominal:      General: Bowel sounds are normal. There is no distension.      Palpations: Abdomen is soft.      Tenderness: There is no abdominal tenderness. There is no guarding.   Musculoskeletal:         General: No tenderness or deformity. Normal range of motion.      Cervical back: Normal range of motion and neck supple. No rigidity or tenderness.   Lymphadenopathy:      Cervical: No cervical adenopathy.   Skin:     General: Skin is warm and dry.      Capillary Refill: Capillary refill takes less than 2 seconds.      Coloration: Skin is not pale.       Findings: No erythema or rash.   Neurological:      General: No focal deficit present.      Mental Status: Delphine Hernandez is alert and oriented to person, place, and time.      Cranial Nerves: No cranial nerve deficit.      Coordination: Coordination normal.      Comments: No focal motor sensory deficit   Psychiatric:         Mood and Affect: Mood normal.         Behavior: Behavior normal.         Thought Content: Thought content normal.         Judgment: Judgment normal.       Procedures           ED Course  ED Course as of 08/24/23 1255   Thu Aug 24, 2023   1203 Review the patient's laboratory studies: The patient's CMP, CBC, lipase were all normal.  Beta-hCG negative.  Urinalysis has trace leukocyte esterases but microscopic exam of the urine reveals 0-2 WBCs and 0-2 RBCs with no bacteria which is not significant to diagnose a UTI. [TP]   1204 CT of the head was interpreted by the radiologist as no clearly acute intracranial process.  No evidence of hemorrhage. [TP]   1204 The patient was administered Decadron 10 mg, Benadryl 25 mg and Compazine 5 mg for her headache.  She was administered normal saline 1 L IV bolus. [TP]   1224 12:25 repeat examination the patient says her headache is some better.  I discussed with her her test results.  Informed the patient that she needs to be evaluated for her migraines by neurologist who could help her manage them. [TP]   1253 The patient was prescribed Compazine for her migraines. [TP]      ED Course User Index  [TP] Derik Hammond MD                                           Medical Decision Making  My differential diagnosis for headache includes but is not limited to:  Migraine, cluster, ischemic stroke, subarachnoid hemorrhage, intracranial hemorrhage, vascular malformation, cerebral aneurysm, vascular dissection, vasculitis, temporal arteritis, malignant hypertension, pheochromocytoma, cerebral venous thrombosis, preeclampsia; bacterial meningitis, viral meningitis,  fungal meningitis, encephalitis, brain abscess, pleural empyema, sinusitis, dental infection, influenza, viral syndrome; carbon monoxide exposure, analgesic abuse, hypoglycemia; trigeminal neuralgia, postherpetic neuralgia, occipital neuralgia; subdural hematoma, concussion, musculoskeletal tension, cervical osteoarthritis; glaucoma, TMJ disease, pseudotumor cerebri, post LP headache, intracranial neoplasm, sleep apnea     Problems Addressed:  Intractable chronic migraine without aura and without status migrainosus: complicated acute illness or injury    Amount and/or Complexity of Data Reviewed  Labs: ordered. Decision-making details documented in ED Course.  Radiology: ordered. Decision-making details documented in ED Course.    Risk  Prescription drug management.          Labs Reviewed   URINALYSIS W/ MICROSCOPIC IF INDICATED (NO CULTURE) - Abnormal; Notable for the following components:       Result Value    Blood, UA Trace (*)     Leuk Esterase, UA Trace (*)     All other components within normal limits   URINALYSIS, MICROSCOPIC ONLY - Abnormal; Notable for the following components:    RBC, UA 0-2 (*)     WBC, UA 0-2 (*)     All other components within normal limits   HCG, SERUM, QUALITATIVE - Normal   LIPASE - Normal   CBC WITH AUTO DIFFERENTIAL - Normal   COMPREHENSIVE METABOLIC PANEL    Narrative:     GFR Normal >60  Chronic Kidney Disease <60  Kidney Failure <15     CBC AND DIFFERENTIAL    Narrative:     The following orders were created for panel order CBC & Differential.  Procedure                               Abnormality         Status                     ---------                               -----------         ------                     CBC Auto Differential[[id number]]        Normal              Final result                 Please view results for these tests on the individual orders.   .w  Etread       Medication List        New Prescriptions      prochlorperazine 10 MG tablet  Commonly known as:  COMPAZINE  Take 1 tablet by mouth Every 6 (Six) Hours As Needed for Nausea or Vomiting (headache) for up to 10 doses.               Where to Get Your Medications        These medications were sent to [id number] - [place] - [address] [facility] [address] [facility] [address] - [phone]  - [phone] [facility] [facility] [address]      Phone: [phone]   prochlorperazine 10 MG tablet           Final diagnoses:   Intractable chronic migraine without aura and without status migrainosus       ED Disposition  ED Disposition       ED Disposition   Discharge    Condition   Stable    Comment   --               Head, Mackenzie N, APRN  [address] [id number] [phone]    Schedule an appointment as soon as possible for a visit   next available to refer you to a neurologist for headache evaluation and management.         Medication List        New Prescriptions      prochlorperazine 10 MG tablet  Commonly known as: COMPAZINE  Take 1 tablet by mouth Every 6 (Six) Hours As Needed for Nausea or Vomiting (headache) for up to 10 doses.               Where to Get Your Medications        These medications were sent to [id number] - [place] [facility] [facility] [address] AT [facility] [address] - [phone] [facility] [phone] [address] [address]      Phone: [phone]   prochlorperazine 10 MG tablet            Derik Hammond MD  08/24/23 5725

## 2023-09-05 ENCOUNTER — OFFICE VISIT (OUTPATIENT)
Dept: FAMILY MEDICINE CLINIC | Facility: CLINIC | Age: 23
End: 2023-09-05
Payer: COMMERCIAL

## 2023-09-05 VITALS
HEIGHT: 70 IN | TEMPERATURE: 97.8 F | OXYGEN SATURATION: 98 % | BODY MASS INDEX: 22.19 KG/M2 | HEART RATE: 100 BPM | DIASTOLIC BLOOD PRESSURE: 68 MMHG | SYSTOLIC BLOOD PRESSURE: 106 MMHG

## 2023-09-05 DIAGNOSIS — Z11.52 ENCOUNTER FOR SCREENING FOR COVID-19: ICD-10-CM

## 2023-09-05 DIAGNOSIS — R20.0 NUMBNESS AND TINGLING: ICD-10-CM

## 2023-09-05 DIAGNOSIS — R51.9 FREQUENT HEADACHES: ICD-10-CM

## 2023-09-05 DIAGNOSIS — R42 DIZZINESS: ICD-10-CM

## 2023-09-05 DIAGNOSIS — R20.2 NUMBNESS AND TINGLING: ICD-10-CM

## 2023-09-05 DIAGNOSIS — J06.9 UPPER RESPIRATORY TRACT INFECTION, UNSPECIFIED TYPE: Primary | ICD-10-CM

## 2023-09-05 LAB
EXPIRATION DATE: NORMAL
INTERNAL CONTROL: NORMAL
Lab: NORMAL
SARS-COV-2 AG UPPER RESP QL IA.RAPID: NOT DETECTED

## 2023-09-05 PROCEDURE — 87426 SARSCOV CORONAVIRUS AG IA: CPT | Performed by: NURSE PRACTITIONER

## 2023-09-05 PROCEDURE — 99213 OFFICE O/P EST LOW 20 MIN: CPT | Performed by: NURSE PRACTITIONER

## 2023-09-05 RX ORDER — GUAIFENESIN 600 MG/1
1200 TABLET, EXTENDED RELEASE ORAL 2 TIMES DAILY
COMMUNITY
Start: 2023-09-05

## 2023-09-05 RX ORDER — AMOXICILLIN AND CLAVULANATE POTASSIUM 875; 125 MG/1; MG/1
1 TABLET, FILM COATED ORAL 2 TIMES DAILY
Qty: 14 TABLET | Refills: 0 | Status: SHIPPED | OUTPATIENT
Start: 2023-09-05 | End: 2023-09-12

## 2023-09-05 NOTE — LETTER
September 5, 2023     Patient: Delphine Hernandez   YOB: 2000   Date of Visit: 9/5/2023       To Whom It May Concern:    Delphine Hernandez was seen in office today for acute illness.  It is my medical opinion that Delphine Hernandez may return to work on Thursday, September 7, 2023 as long as symptoms improve.          Sincerely,        Mackenzie Ndiaye, APRN    CC: No Recipients

## 2023-09-05 NOTE — PROGRESS NOTES
"Chief Complaint   Patient presents with    Cough    Fatigue    Headache       Upper Respiratory Infection: Patient complains of symptoms of a URI. Symptoms include congestion and cough. Onset of symptoms was 1 week ago, gradually worsening since that time. Delphine Hernandez also c/o achiness, congestion, no  fever, shortness of breath, sore throat, and headache  for the past 1 day .  Delphine Hernandez is drinking moderate amounts of fluids. Evaluation to date: none. Treatment to date: none.  Ill contacts at home or school or work discussed.    Continues to have headaches and dizziness.  CT head completed during recent ER visit which was stable.  Has MRI scheduled.  Requesting neurology referral.      The following portions of the patient's history were reviewed and updated as appropriate: allergies, current medications, past family history, past medical history, past social history, past surgical history and problem list.        Vitals:    09/05/23 1059   BP: 106/68   Pulse: 100   Temp: 97.8 °F (36.6 °C)   SpO2: 98%   Height: 177.5 cm (69.88\")     Gen: Mildly ill appearing, alert, congested, masked  Ears: TM's normal without redness  Nose:  Congestion  Throat:  Red without exudate, some drainage  Neck: No LAD  Lung: Mild rales, good air movement, regular RR  Heart: RR without murmur  Skin: No rash    POCT SARS-CoV-2 Antigen GHASSAN (09/05/2023 11:31)    Assessment & Plan   Diagnoses and all orders for this visit:    1. Upper respiratory tract infection, unspecified type (Primary)  -     guaiFENesin (Mucinex) 600 MG 12 hr tablet; Take 2 tablets by mouth 2 (Two) Times a Day.  -     amoxicillin-clavulanate (AUGMENTIN) 875-125 MG per tablet; Take 1 tablet by mouth 2 (Two) Times a Day for 7 days.  Dispense: 14 tablet; Refill: 0    2. Encounter for screening for COVID-19  -     POCT SARS-CoV-2 Antigen GHASSAN    3. Frequent headaches  -     Ambulatory Referral to Neurology    4. Numbness and tingling  -     Ambulatory Referral to " Neurology    5. Dizziness  -     Ambulatory Referral to Neurology         Tylenol or Advil as needed for pain, fever, muscle aches  Plenty of fluids  Hand washing discussed  Off work or school note given if needed.  Warm tea for throat.  Pros and cons of antibiotic use discussed.  Instructed to notify us if symptoms worsen or do not improve.      Patient was wearing face mask when I entered the room and throughout our encounter. Protective equipment was worn throughout this patient encounter including a face mask.  Hand hygiene was performed before donning protective equipment and after removal when leaving the room.     SHAREE Killian  Family Practice  [facility]

## 2023-09-11 RX ORDER — PROCHLORPERAZINE MALEATE 10 MG
10 TABLET ORAL EVERY 6 HOURS PRN
Qty: 10 TABLET | Refills: 0 | Status: SHIPPED | OUTPATIENT
Start: 2023-09-11

## 2023-09-11 NOTE — TELEPHONE ENCOUNTER
Caller: Delphine Hernandez    Relationship: Self    Best call back number: [phone] (Mobile)     Requested Prescriptions:   Requested Prescriptions     Pending Prescriptions Disp Refills    prochlorperazine (COMPAZINE) 10 MG tablet 10 tablet 0     Sig: Take 1 tablet by mouth Every 6 (Six) Hours As Needed for Nausea or Vomiting (headache) for up to 10 doses.        Pharmacy where request should be sent: [id number] - [place] [facility] [address] - [phone]  - [phone] FX     Last office visit with prescribing clinician: 9/5/2023   Last telemedicine visit with prescribing clinician: Visit date not found   Next office visit with prescribing clinician: Visit date not found     Additional details provided by patient: ONLY 3 LEFT     Does the patient have less than a 3 day supply:  [x] Yes  [] No    Would you like a call back once the refill request has been completed: [x] Yes [] No    If the office needs to give you a call back, can they leave a voicemail: [x] Yes [] No    Joo Lincoln Rep   09/11/23 12:50 EDT

## 2023-09-12 ENCOUNTER — HOSPITAL ENCOUNTER (OUTPATIENT)
Dept: MRI IMAGING | Facility: HOSPITAL | Age: 23
Discharge: HOME | End: 2023-09-12
Admitting: NURSE PRACTITIONER
Payer: COMMERCIAL

## 2023-09-12 DIAGNOSIS — R20.0 NUMBNESS AND TINGLING: ICD-10-CM

## 2023-09-12 DIAGNOSIS — R51.9 FREQUENT HEADACHES: ICD-10-CM

## 2023-09-12 DIAGNOSIS — R20.2 NUMBNESS AND TINGLING: ICD-10-CM

## 2023-09-12 DIAGNOSIS — R55 SYNCOPE, UNSPECIFIED SYNCOPE TYPE: ICD-10-CM

## 2023-09-12 PROCEDURE — A9577 INJ MULTIHANCE: HCPCS | Performed by: NURSE PRACTITIONER

## 2023-09-12 PROCEDURE — 0 GADOBENATE DIMEGLUMINE 529 MG/ML SOLUTION: Performed by: NURSE PRACTITIONER

## 2023-09-12 PROCEDURE — 70553 MRI BRAIN STEM W/O & W/DYE: CPT

## 2023-09-12 RX ADMIN — GADOBENATE DIMEGLUMINE 13 ML: 529 INJECTION, SOLUTION INTRAVENOUS at 09:01

## 2023-09-26 ENCOUNTER — TELEPHONE (OUTPATIENT)
Dept: FAMILY MEDICINE CLINIC | Facility: CLINIC | Age: 23
End: 2023-09-26

## 2023-09-26 RX ORDER — ALBUTEROL SULFATE 2.5 MG/3ML
2.5 SOLUTION RESPIRATORY (INHALATION) EVERY 4 HOURS PRN
Qty: 100 EACH | Refills: 0 | Status: SHIPPED | OUTPATIENT
Start: 2023-09-26

## 2023-09-26 NOTE — TELEPHONE ENCOUNTER
Caller: Delphine Hernandez    Relationship: Self    Best call back number: [phone]    What test was performed: BLOODWORK    When was the test performed: LATE AUGUST OR EARLY SEPTEMBER    Where was the test performed: IN OFFICE    Additional notes: PATIENT STATED THAT THEY WOULD LIKE TO KNOW ABOUT THE SODIUM LEVELS FROM THE BLOODWORK THAT WAS PERFORMED. VOICEMAIL IS OKAY TO LEAVE PER PATIENT AND WOULD LIKE A CALL TO DISCUSS

## 2023-09-26 NOTE — TELEPHONE ENCOUNTER
Caller: David Delphine R    Relationship: Self    Best call back number: [phone]    Requested Prescriptions:   Requested Prescriptions     Pending Prescriptions Disp Refills    albuterol (PROVENTIL) (2.5 MG/3ML) 0.083% nebulizer solution 100 each 0     Sig: Take 2.5 mg by nebulization Every 4 (Four) Hours As Needed for Wheezing or Shortness of Air.        Pharmacy where request should be sent: [facility][id number] - [place] [address] AT [facility] [address] - [phone]  - [phone] FX     Last office visit with prescribing clinician: 9/5/2023   Last telemedicine visit with prescribing clinician: Visit date not found   Next office visit with prescribing clinician: Visit date not found     Additional details provided by patient: PATIENT STATED THEY HAVE ABOUT 5 REMAINING AND THAT THEY WILL BE LEAVING TOWN ON MONDAY 10/2/23 AND AGAIN LATER THAT MONTH    Does the patient have less than a 3 day supply:  [x] Yes  [] No    Would you like a call back once the refill request has been completed: [] Yes [x] No    If the office needs to give you a call back, can they leave a voicemail: [] Yes [x] No    Joo Slater Rep   09/26/23 10:23 EDT

## 2023-09-26 NOTE — TELEPHONE ENCOUNTER
Left message on pt cell phone. All labs done in office 8/22/23 were normal.  Sodium level normal.  It was also normal in the ED when checked a few days later.

## 2023-11-14 ENCOUNTER — OFFICE VISIT (OUTPATIENT)
Dept: NEUROLOGY | Facility: CLINIC | Age: 23
End: 2023-11-14
Payer: COMMERCIAL

## 2023-11-14 VITALS — OXYGEN SATURATION: 98 %

## 2023-11-14 DIAGNOSIS — G43.009 MIGRAINE WITHOUT AURA AND WITHOUT STATUS MIGRAINOSUS, NOT INTRACTABLE: Primary | ICD-10-CM

## 2023-11-14 PROCEDURE — 99204 OFFICE O/P NEW MOD 45 MIN: CPT | Performed by: PSYCHIATRY & NEUROLOGY

## 2023-11-14 RX ORDER — SUMATRIPTAN 100 MG/1
100 TABLET, FILM COATED ORAL ONCE AS NEEDED
Qty: 9 TABLET | Refills: 3 | Status: SHIPPED | OUTPATIENT
Start: 2023-11-14 | End: 2023-12-14

## 2023-11-14 NOTE — PROGRESS NOTES
Notes by MA:  PT is here today as a referral from SHAREE Killian. They experience migraines, dizziness, fainting, fatigue and numbness.      Subjective:     Dear Mackenzie, thank you for referring Ms. Hernandez for neurological consultation.  As you know, she is a 23-year-old woman sent for evaluation of headaches.  She remembers having headaches all the way back into elementary school years.  They are typically throbbing in quality and associated with light sensitivity, sound sensitivity and movement sensitivity.  They can last from hours to a few days at a time when she is currently suffering 5-8 headache days per month on average.  She has not used prescription medications for this.  She also has dizziness, primarily when she stands up.  She feels like she is drinking adequate amounts of water but is only drinking 2  16 ounce bottles of water daily.  Patient ID: Delphine Hernandez is a 23 y.o. adult.    Headache    The following portions of the patient's history were reviewed and updated as appropriate: allergies, current medications, past family history, past medical history, past social history, past surgical history, and problem list.    Review of Systems   Constitutional:  Positive for fatigue. Negative for activity change and appetite change.   HENT:  Negative for facial swelling, trouble swallowing and voice change.    Eyes:  Positive for photophobia. Negative for pain and visual disturbance.   Respiratory:  Negative for chest tightness, shortness of breath and wheezing.    Cardiovascular:  Negative for chest pain, palpitations and leg swelling.   Gastrointestinal:  Positive for nausea and vomiting. Negative for abdominal pain.   Endocrine: Positive for heat intolerance. Negative for cold intolerance.   Musculoskeletal:  Positive for arthralgias and back pain. Negative for gait problem, joint swelling, myalgias, neck pain and neck stiffness.   Neurological:  Positive for dizziness, tremors, syncope, weakness, numbness  and headaches. Negative for seizures, facial asymmetry, speech difficulty and light-headedness.   Hematological:  Bruises/bleeds easily.   Psychiatric/Behavioral:  Positive for decreased concentration, dysphoric mood (clinical depression), hallucinations (auditory) and sleep disturbance (hard time falling asleep and is restless after she falls asleep). Negative for agitation, behavioral problems, confusion, self-injury and suicidal ideas. The patient is nervous/anxious and is hyperactive.         Objective:    Neurologic Exam  Awake alert pleasant cooperative oriented in all spheres with fluent speech and normal speech comprehension.    Cranial nerves II through XII normal and symmetric.    Motor exam reveals normal symmetric tone bulk and power throughout without drift or spasticity.  No adventitious movements.    The sensory exam reveals normal and symmetric sensation to light touch, temperature, vibration throughout.    Coordination testing reveals smooth and accurate finger-nose-finger bilaterally, normal heel-to-shin bilaterally and normal rhythmic rapid alternating movements.  Romberg testing is negative.    Tendon reflexes are 2+ and symmetric throughout including preserved ankle jerks bilaterally.  No ankle clonus.    Physical Exam    Assessment/Plan:     Diagnoses and all orders for this visit:    1. Migraine without aura and without status migrainosus, not intractable (Primary)    Other orders  -     SUMAtriptan (IMITREX) 100 MG tablet; Take 1 tablet by mouth 1 (One) Time As Needed for Migraine for up to 30 days. Take one tablet at onset of headache. May repeat dose one time in 2 hours if headache not relieved.  Dispense: 9 tablet; Refill: 3     23-year-old young woman with a long history of headaches satisfying diagnostic criteria for migraine.  She has a long family history of migraines on the maternal side helping to substantiate the diagnosis.  We talked about the diagnosis and lifestyle factors that  may impact headache frequency and severity.  I do not think the headache frequency clearly warrants prophylactic therapy at this time.  From an acute standpoint, starting her on sumatriptan 100 mg as needed at onset of headache and we discussed the importance of early treatment.  We talked about potential side effects and I answered her questions today.  We will see how she is doing in 3 months time but I have encouraged her to call in the interim with any problems or questions.      For her postural dizziness I have encouraged her to focus on better hydration.  She has already had an appropriate cardiac evaluation.      Thank you very much for the opportunity to participate in her care.

## 2023-11-20 ENCOUNTER — PATIENT ROUNDING (BHMG ONLY) (OUTPATIENT)
Dept: NEUROLOGY | Facility: CLINIC | Age: 23
End: 2023-11-20
Payer: COMMERCIAL

## 2024-05-07 ENCOUNTER — OFFICE VISIT (OUTPATIENT)
Dept: FAMILY MEDICINE CLINIC | Facility: CLINIC | Age: 24
End: 2024-05-07
Payer: COMMERCIAL

## 2024-05-07 VITALS
SYSTOLIC BLOOD PRESSURE: 110 MMHG | HEART RATE: 100 BPM | TEMPERATURE: 97.8 F | OXYGEN SATURATION: 97 % | BODY MASS INDEX: 22.1 KG/M2 | HEIGHT: 70 IN | DIASTOLIC BLOOD PRESSURE: 70 MMHG

## 2024-05-07 DIAGNOSIS — N89.8 VAGINAL DRYNESS: ICD-10-CM

## 2024-05-07 DIAGNOSIS — N89.8 VAGINAL IRRITATION: Primary | ICD-10-CM

## 2024-05-07 PROCEDURE — 99213 OFFICE O/P EST LOW 20 MIN: CPT | Performed by: NURSE PRACTITIONER

## 2024-05-07 RX ORDER — TRIAMCINOLONE ACETONIDE 1 MG/G
1 OINTMENT TOPICAL 2 TIMES DAILY
Qty: 30 G | Refills: 0 | Status: SHIPPED | OUTPATIENT
Start: 2024-05-07

## 2024-10-01 ENCOUNTER — TELEPHONE (OUTPATIENT)
Dept: FAMILY MEDICINE CLINIC | Facility: CLINIC | Age: 24
End: 2024-10-01

## 2024-10-01 NOTE — TELEPHONE ENCOUNTER
Caller: Delphine Hernandez    Relationship: Self    Best call back number: [phone]     What was the call regarding: PATIENT STATED THAT SHE HAD NEVER RECEIVED HER DERMATOLOGY REFERRAL THAT WAS SUPPOSED TO BE DONE AT HER LAST VISIT IN MAY 2024. PLEASE ADVISE.

## 2024-10-22 ENCOUNTER — PATIENT ROUNDING (BHMG ONLY) (OUTPATIENT)
Dept: URGENT CARE | Facility: CLINIC | Age: 24
End: 2024-10-22
Payer: COMMERCIAL

## 2024-10-22 NOTE — ED NOTES
Thank you for letting us care for you in your recent visit to our urgent care center. We would love to hear about your experience with us. Was this the first time you have visited our location?    We’re always looking for ways to make our patients’ experiences even better. Do you have any recommendations on ways we may improve?     I appreciate you taking the time to respond. Please be on the lookout for a survey about your recent visit from [employer] via text or email. We would greatly appreciate if you could fill that out and turn it back in. We want your voice to be heard and we value your feedback.   Thank you for choosing [facility] for your healthcare needs.

## 2025-01-08 ENCOUNTER — TELEPHONE (OUTPATIENT)
Dept: OBSTETRICS AND GYNECOLOGY | Facility: CLINIC | Age: 25
End: 2025-01-08

## 2025-01-08 NOTE — TELEPHONE ENCOUNTER
ESTEBAN BAUM    [phone]    PT WANTS TO SEE A NEW PROVIDER OTHER THAN DR CORBIN, DR MEJIAS OR CONCHITA HARVEY APRN    REASON BECAUSE THE LAST 5YRS THEY HAVE DISMISSED HER ISSUES    PT EXPERIENCING DRYNESS, TEARS IN VAGINA     RASH [employer] SWELLING

## 2025-01-08 NOTE — TELEPHONE ENCOUNTER
PATIENT CALLED REGARDING MESSAGE SENT THIS MORNING. SHE STATES SHE RECEIVED A CALL BACK FROM SOMEONE IN OFFICE SINCE THEN.

## 2025-01-23 ENCOUNTER — OFFICE VISIT (OUTPATIENT)
Dept: OBSTETRICS AND GYNECOLOGY | Facility: CLINIC | Age: 25
End: 2025-01-23
Payer: COMMERCIAL

## 2025-01-23 VITALS — BODY MASS INDEX: 22.11 KG/M2 | HEIGHT: 70 IN | DIASTOLIC BLOOD PRESSURE: 86 MMHG | SYSTOLIC BLOOD PRESSURE: 124 MMHG

## 2025-01-23 DIAGNOSIS — N90.89 VULVAR IRRITATION: Primary | ICD-10-CM

## 2025-01-23 DIAGNOSIS — Z13.89 SCREENING FOR GENITOURINARY CONDITION: ICD-10-CM

## 2025-01-23 DIAGNOSIS — Z11.51 SCREENING FOR HUMAN PAPILLOMAVIRUS (HPV): ICD-10-CM

## 2025-01-23 DIAGNOSIS — R23.2 HOT FLASHES: ICD-10-CM

## 2025-01-23 DIAGNOSIS — Z01.419 ROUTINE GYNECOLOGICAL EXAMINATION: ICD-10-CM

## 2025-01-23 DIAGNOSIS — Z01.419 ENCOUNTER FOR ANNUAL ROUTINE GYNECOLOGICAL EXAMINATION: ICD-10-CM

## 2025-01-23 LAB
B-HCG UR QL: NEGATIVE
BILIRUB BLD-MCNC: NEGATIVE MG/DL
CLARITY, POC: CLEAR
COLOR UR: YELLOW
EXPIRATION DATE: NORMAL
GLUCOSE UR STRIP-MCNC: NEGATIVE MG/DL
INTERNAL NEGATIVE CONTROL: NEGATIVE
INTERNAL POSITIVE CONTROL: POSITIVE
KETONES UR QL: NEGATIVE
LEUKOCYTE EST, POC: NEGATIVE
Lab: 55
NITRITE UR-MCNC: NEGATIVE MG/ML
PH UR: 5 [PH] (ref 5–8)
PROT UR STRIP-MCNC: ABNORMAL MG/DL
RBC # UR STRIP: ABNORMAL /UL
SP GR UR: 1 (ref 1–1.03)
UROBILINOGEN UR QL: NORMAL

## 2025-01-23 RX ORDER — ESTRADIOL 0.1 MG/G
1 CREAM VAGINAL DAILY
Qty: 45 G | Refills: 1 | Status: SHIPPED | OUTPATIENT
Start: 2025-01-23

## 2025-01-23 NOTE — PROGRESS NOTES
"Chief Complaint  Vaginal Dryness and Itching (Itching comes and goes and started during Claudio. Feels tearing in the Vaginal Area. )  Annual exam    Subjective        Delphine Hernandez presents to [facility]  For workup and management of some vulvar irritation.  This is going on for about 6 years.  It predates any hormonal medication she has been on (previous Nexplanon, current Mirena).  Has tried a previous course of high-pregnancy corticosteroids.  Is using vulvar lotions and ointments.  Shows pictures and tells a history of recurrent vulvar fissures and tears that feel like pins-and-needles, sawing irritation, and burning.  Had to change underwear and finds themselves frequently shifting positions due to the irritation.    Also due for annual examination.  Had a normal Pap smear in July 2021.  Describes some thick, white, persistent vaginal discharge that is without odor.  Has been treated for yeast and Ureaplasma in the past.  Denies any breast related complaints.  Has a Mirena and is amenorrheic.  No premenstrual type symptoms.  Same sex relationship, has pain with penetration and pelvic floor dysfunction, is seeing pelvic for PT for this.      Objective   Vital Signs:  /86   Ht 177.8 cm (70\")   BMI 22.11 kg/m²   Estimated body mass index is 22.11 kg/m² as calculated from the following:    Height as of this encounter: 177.8 cm (70\").    Weight as of 10/21/24: 69.9 kg (154 lb 1.6 oz).    BMI is within normal parameters. No other follow-up for BMI required.      Physical Exam  Constitutional:       General: Delphine is not in acute distress.     Appearance: Normal appearance. Delphine is not ill-appearing.   Eyes:      Pupils: Pupils are equal, round, and reactive to light.   Cardiovascular:      Rate and Rhythm: Normal rate.   Pulmonary:      Effort: Pulmonary effort is normal. No respiratory distress.   Abdominal:      General: Abdomen is flat. There is no distension. "   Genitourinary:         Comments: Small subcentimeter fissure noted patient's right of midline on the perineum.  Obliteration of the labia minora and majora.  Narrow introitus    SSE: Normal appearing vaginal mucosa with scant amount of thick-white discharge.   Normal appearing cervix without lesions or friability.   IUD strings NOT visualized   Neurological:      General: No focal deficit present.      Mental Status: Delphine is alert.   Psychiatric:         Mood and Affect: Mood normal.         Thought Content: Thought content normal.        Vulvar biopsy procedure note:  After verbal and written consent the patient was placed in dorsolithotomy position.  Topical lidocaine was applied prior to injection of dilute lidocaine on the right labia as well as left perineum.  After a few minutes delay a 5 mm Naselle biopsy was utilized to obtain tissue biopsy of the right labia and also left of the perineum.  Silver nitrate was applied to the biopsy sites and were hemostatic.  Specimens were labeled and sent to pathology.    Result Review :         Assessment and Plan   Diagnoses and all orders for this visit:    1. Vulvar irritation (Primary)  -     Reference Histopathology  -     estradiol (ESTRACE) 0.1 MG/GM vaginal cream; Insert 1 g into the vagina Daily. Apply directly to vulva and vestibular tissues 0.5 to 1 g once daily for 2 weeks. Maintenance: 0.5 to 1 g one to three times per week  Dispense: 45 g; Refill: 1    2. Screening for genitourinary condition  -     POC Urinalysis Dipstick  -     POC Pregnancy, Urine    3. Hot flashes  -     Follicle Stimulating Hormone  -     Estradiol    4. Routine gynecological examination  -     IGP,CtNgTv,rfx Aptima HPV ASCU    5. Screening for human papillomavirus (HPV)  -     IGP,CtNgTv,rfx Aptima HPV ASCU    6. Encounter for annual routine gynecological examination      Extensive conversation visit today.  Sounds like this has been a very long road to get to today for the patient and  her symptoms.  We will check labs today to evaluate for a hypoestrogenic picture.  We also will trial vaginal and vulvar estrogen therapy as she has failed corticosteroids in the past and her anatomy reflects more of a hypoestrogenic state.  Her strings were not visualized on pelvic exam today and a transvaginal ultrasound was ordered.  Follow-up biopsy pathology.    Annual exam completed today, follow-up Pap smears and pelvic swabs.       I spent 25 minutes on the separately reported service of vulvar irritation. This time is not included in the time used to support the E/M service also reported today.    Follow Up   Return in about 4 weeks (around 2/20/2025) for Vulvar irritation, f/u vaginal estrogen.  Patient was given instructions and counseling regarding Delphine's condition or for health maintenance advice. Please see specific information pulled into the AVS if appropriate.     Cisco Moore MD  1/23/2025   12:48 EST

## 2025-01-24 LAB
ESTRADIOL SERPL-MCNC: 130 PG/ML
FSH SERPL-ACNC: 5.8 MIU/ML

## 2025-01-26 ENCOUNTER — HOSPITAL ENCOUNTER (EMERGENCY)
Facility: HOSPITAL | Age: 25
Discharge: HOME | End: 2025-01-26
Payer: COMMERCIAL

## 2025-01-26 VITALS
WEIGHT: 140 LBS | HEART RATE: 90 BPM | SYSTOLIC BLOOD PRESSURE: 138 MMHG | HEIGHT: 70 IN | OXYGEN SATURATION: 98 % | TEMPERATURE: 98 F | DIASTOLIC BLOOD PRESSURE: 80 MMHG | BODY MASS INDEX: 20.04 KG/M2 | RESPIRATION RATE: 18 BRPM

## 2025-01-26 DIAGNOSIS — L98.9 SKIN LESION: ICD-10-CM

## 2025-01-26 DIAGNOSIS — R10.2 VAGINAL PAIN: Primary | ICD-10-CM

## 2025-01-26 PROCEDURE — 99283 EMERGENCY DEPT VISIT LOW MDM: CPT

## 2025-01-26 RX ORDER — CEPHALEXIN 500 MG/1
500 CAPSULE ORAL 4 TIMES DAILY
Qty: 20 CAPSULE | Refills: 0 | Status: SHIPPED | OUTPATIENT
Start: 2025-01-26 | End: 2025-01-26

## 2025-01-26 RX ORDER — ACETAMINOPHEN 325 MG/1
650 TABLET ORAL ONCE
Status: COMPLETED | OUTPATIENT
Start: 2025-01-26 | End: 2025-01-26

## 2025-01-26 RX ORDER — HYDROCODONE BITARTRATE AND ACETAMINOPHEN 5; 325 MG/1; MG/1
1 TABLET ORAL EVERY 6 HOURS PRN
Qty: 15 TABLET | Refills: 0 | Status: SHIPPED | OUTPATIENT
Start: 2025-01-26

## 2025-01-26 RX ORDER — CEPHALEXIN 500 MG/1
500 CAPSULE ORAL 4 TIMES DAILY
Qty: 20 CAPSULE | Refills: 0 | Status: SHIPPED | OUTPATIENT
Start: 2025-01-26 | End: 2025-01-31

## 2025-01-26 RX ORDER — HYDROCODONE BITARTRATE AND ACETAMINOPHEN 5; 325 MG/1; MG/1
1 TABLET ORAL ONCE
Status: COMPLETED | OUTPATIENT
Start: 2025-01-26 | End: 2025-01-26

## 2025-01-26 RX ORDER — HYDROCODONE BITARTRATE AND ACETAMINOPHEN 5; 325 MG/1; MG/1
1 TABLET ORAL EVERY 6 HOURS PRN
Qty: 15 TABLET | Refills: 0 | Status: SHIPPED | OUTPATIENT
Start: 2025-01-26 | End: 2025-01-26

## 2025-01-26 RX ORDER — IBUPROFEN 400 MG/1
400 TABLET, FILM COATED ORAL ONCE
Status: COMPLETED | OUTPATIENT
Start: 2025-01-26 | End: 2025-01-26

## 2025-01-26 RX ADMIN — ACETAMINOPHEN 650 MG: 325 TABLET ORAL at 10:45

## 2025-01-26 RX ADMIN — IBUPROFEN 400 MG: 400 TABLET, FILM COATED ORAL at 10:45

## 2025-01-26 RX ADMIN — HYDROCODONE BITARTRATE AND ACETAMINOPHEN 1 TABLET: 5; 325 TABLET ORAL at 10:45

## 2025-01-26 NOTE — ED PROVIDER NOTES
Subjective   History of Present Illness    24-year-old female with a history of asthma, anxiety, depression, ovarian cysts, migraines, chronic pelvic pain, presenting to the emergency department for evaluation of abdominal and pelvic pain.  Patient reports having 2 external vaginal biopsies as well as a Pap smear about 3 days ago, has been having pain at the biopsy sites ever since, it got so bad overnight that she was unable to sleep.  She had 1 episode of vomiting, no abdominal cramping, no fevers or chills, no change in her chronic vaginal discharge.  She has not been using any of her over-the-counter creams since the biopsy.  No vaginal penetration since the biopsy.  No urinary complaints, no fever or chills.    Review of Systems    ROS as specified in HPI.    Past Medical History:   Diagnosis Date    Anxiety     Asthma     Chronic nausea     Depression     Hx of migraines        Allergies   Allergen Reactions    Pepcid [Famotidine] Hives       Past Surgical History:   Procedure Laterality Date    DIAGNOSTIC LAPAROSCOPY N/A 9/22/2022    Procedure: DIAGNOSTIC LAPAROSCOPY;  Surgeon: Jamil Prado MD;  Location: [facility];  Service: Obstetrics/Gynecology;  Laterality: N/A;       Family History   Problem Relation Age of Onset    Asthma Mother     Depression Mother     Anxiety disorder Mother     Asthma Maternal Grandmother     Depression Maternal Grandmother     Arthritis Maternal Grandmother     Colon cancer Neg Hx     Colon polyps Neg Hx     Crohn's disease Neg Hx     Ulcerative colitis Neg Hx     Irritable bowel syndrome Neg Hx        Social History     Socioeconomic History    Marital status: Single   Tobacco Use    Smoking status: Never    Smokeless tobacco: Never   Vaping Use    Vaping status: Some Days    Substances: THC, CBD, Flavoring    Devices: Disposable, Pre-filled or refillable cartridge   Substance and Sexual Activity    Alcohol use: Yes     Comment: social    Drug use: Yes     Types:  Marijuana     Comment: occ    Sexual activity: Not Currently     Partners: Male     Birth control/protection: I.U.D.           Objective   Physical Exam  Vitals reviewed.   Constitutional:       General: Delphine is not in acute distress.     Appearance: Delphine is normal weight. Delphine is not toxic-appearing.   HENT:      Head: Normocephalic and atraumatic.      Mouth/Throat:      Pharynx: Oropharynx is clear.   Eyes:      General: No scleral icterus.     Conjunctiva/sclera: Conjunctivae normal.   Pulmonary:      Effort: Pulmonary effort is normal. No respiratory distress.   Abdominal:      General: There is no distension.      Palpations: Abdomen is soft.      Tenderness: There is no abdominal tenderness.   Genitourinary:     Comments: Roughly 0.5 cm biopsy sites at the right labia majora around the mid and perineal aspects with mild redness and minimal swelling, no purulence, no visible sinus tracts, mild tenderness.  Musculoskeletal:         General: No swelling or deformity. Normal range of motion.      Cervical back: Normal range of motion and neck supple. No rigidity.      Right lower leg: No edema.      Left lower leg: No edema.   Skin:     General: Skin is warm and dry.      Coloration: Skin is not pale.   Neurological:      Mental Status: Delphine is alert and oriented to person, place, and time. Mental status is at baseline.   Psychiatric:         Mood and Affect: Mood normal.         Behavior: Behavior normal.         Procedures           ED Course                                                       Medical Decision Making      Final diagnoses:   Vaginal pain   Skin lesion       ED Disposition  ED Disposition       ED Disposition   Discharge    Condition   Stable    Comment   --               Mackenzie Ndiaye, APRN  [address] [facility] [id number] [phone]    Call in 3 days           Medication List        New Prescriptions      cephalexin 500 MG capsule  Commonly known as: KEFLEX  Take 1 capsule by  mouth 4 (Four) Times a Day for 5 days.     HYDROcodone-acetaminophen 5-325 MG per tablet  Commonly known as: NORCO  Take 1 tablet by mouth Every 6 (Six) Hours As Needed for Severe Pain for up to 15 doses.               Where to Get Your Medications        These medications were sent to [id number] - [place], [place] - [address] AT [facility] & [address] - [phone] PH - [phone]   [address], [place] [address]      Phone: [phone]   cephalexin 500 MG capsule  HYDROcodone-acetaminophen 5-325 MG per tablet         ED Course: 24-year-old female presenting to the emergency department for biopsy site pain has been worsening over the last 24 hours.  Biopsies to the right mid labia majora as well as perineal aspect of the labia majora as described above.  Some redness and tenderness but no carol ann purulence, no evidence of abscess.  Vital stable.    Pain controlled with Tylenol, Norco, ibuprofen.  Will give patient a short course of Norco for home for severe uncontrolled pain despite over-the-counter medicines, as well as short Keflex course, recommended close follow-up with her PCP and OB/Guynn for wound recheck early in the week.  At this time does not appear to have any abscess formation or any evidence of intra-abdominal process, cannot say for certain if the redness is early infection therefore will treat conservatively given worsening pain.    EKG: None    Consults: None    Incidental Findings: None       Fareed Garcia DO  01/26/25 [id number]

## 2025-01-26 NOTE — DISCHARGE INSTRUCTIONS
You were seen in the emergency department today for vaginal biopsy site pain Vital signs + a medical screening exam were performed, and the need for any labwork and/ or imaging were discussed. Results of the above, if performed, were also discussed as well as their clinical significance.     You should schedule a follow-up appointment with your family medicine doctor within the next 2-3 days. If you do not have a family medicine provider we can provide you with contact information to establish care.    Take full course of antibiotics, use Norco as needed for severe pain is not controlled with Tylenol, ibuprofen if pre-existing medical problems do not prevent their use.    Use Norco with caution as it can have habit-forming and sedating effects.    Any prescriptions written today can have a paper copy provided for you to take to any pharmacy you would like in the event that your primary pharmacy is closed.  For any medications that were prescribed as a daily medication and for which you received a dose in the emergency department such as antibiotics, unless otherwise instructed take your 1st dose tomorrow. If you would prefer a paper copy of your prescription, let your treating physician or nurse know.     It is important to remember that symptoms and progression of illness/ injury can change, so do not hesitate to return to the Emergency Department for any new or worsening symptoms.     You should return to the Emergency Department or seek immediate medical care if you develop new or worsening symptoms including but not limited to fever/chills, uncontrolled pain, pus draining from the lesion sites, severe swelling or redness.

## 2025-01-27 ENCOUNTER — TELEPHONE (OUTPATIENT)
Dept: OBSTETRICS AND GYNECOLOGY | Facility: CLINIC | Age: 25
End: 2025-01-27

## 2025-01-27 DIAGNOSIS — N90.89 VULVAR IRRITATION: ICD-10-CM

## 2025-01-27 RX ORDER — ESTRADIOL 0.1 MG/G
1 CREAM VAGINAL DAILY
Qty: 45 G | Refills: 1 | Status: CANCELLED | OUTPATIENT
Start: 2025-01-27

## 2025-01-27 NOTE — TELEPHONE ENCOUNTER
Provider: Delphine Hernandez  Other , 24 y.o., 2000  MRN: [id number]  CSN: [id number]  Phone: [phone]    Caller: Delphine Hernandez    Relationship to Patient: Self            Reason for Call: PT CALLED AND WOULD LIKE A CALL BACK TO BE ADVISED AS WELL AS A MYCHART MESSAGE REG SEVERE PAIN IN HER LABIA AREA, PT REPORTS NOT BEING ABLE TO SIT DOWN ALONG WITH VOMITING, ON SATURDAY AS WELL AS THIS MORNING, PT STATES THE AREA LOOKS IRRITATED, WITH  MUCUS LIKE LIQUID AROUND THE AREA

## 2025-01-27 NOTE — TELEPHONE ENCOUNTER
Caller: Delphine Hernandez [place]    Relationship: Self    Best call back number: [phone]    What form or medical record are you requesting: PT UNABLE TO FINISH HER SHIFT ON THURSDAY 01/23.     REQUESTING A WORK NOTE THAT SHE HAD THE BIOPSY PROCEDURE COMPLETED    Who is requesting this form or medical record from you: ARTEM GUTHRIE     How would you like to receive the form or medical records (pick-up, mail, fax): FAX  If fax, what is the fax number: [phone]    Timeframe paperwork needed: ASAP    ----    Caller: Delphine Hernandez    Relationship: Self    Best call back number: [phone]    Requested Prescriptions:   Requested Prescriptions     Pending Prescriptions Disp Refills    estradiol (ESTRACE) 0.1 MG/GM vaginal cream 45 g 1     Sig: Insert 1 g into the vagina Daily. Apply directly to vulva and vestibular tissues 0.5 to 1 g once daily for 2 weeks. Maintenance: 0.5 to 1 g one to three times per week        Pharmacy where request should be sent:    PLEASE UPDATE:    [facility]  Address: [address] [place] [address]  Phone: [phone]    Last office visit with prescribing clinician: 1/23/2025   Last telemedicine visit with prescribing clinician: Visit date not found   Next office visit with prescribing clinician: 2/20/2025     Additional details provided by patient: PT INSURANCE NOT ACCEPTED AT [facility]. PT HAS NOT PICKED UP ANY OF THE MEDICATION     Does the patient have less than a 3 day supply:  [x] Yes  [] No    Would you like a call back once the refill request has been completed: [x] Yes [] No    If the office needs to give you a call back, can they leave a voicemail: [x] Yes [] No

## 2025-01-28 DIAGNOSIS — N90.89 VULVAR IRRITATION: Primary | ICD-10-CM

## 2025-01-28 RX ORDER — SILVER SULFADIAZINE 10 MG/G
1 CREAM TOPICAL 2 TIMES DAILY
Qty: 50 G | Refills: 0 | Status: SHIPPED | OUTPATIENT
Start: 2025-01-28 | End: 2025-01-28 | Stop reason: SDUPTHER

## 2025-01-29 LAB
DX ICD CODE: NORMAL
DX ICD CODE: NORMAL
PATH REPORT.FINAL DX SPEC: NORMAL
PATH REPORT.GROSS SPEC: NORMAL
PATH REPORT.SITE OF ORIGIN SPEC: NORMAL
PATHOLOGIST NAME: NORMAL
PAYMENT PROCEDURE: NORMAL

## 2025-01-30 LAB
C TRACH RRNA CVX QL NAA+PROBE: NEGATIVE
CONV .: NORMAL
CYTOLOGIST CVX/VAG CYTO: NORMAL
CYTOLOGY CVX/VAG DOC CYTO: NORMAL
CYTOLOGY CVX/VAG DOC THIN PREP: NORMAL
DX ICD CODE: NORMAL
Lab: NORMAL
Lab: NORMAL
N GONORRHOEA RRNA CVX QL NAA+PROBE: NEGATIVE
OTHER STN SPEC: NORMAL
STAT OF ADQ CVX/VAG CYTO-IMP: NORMAL
T VAGINALIS RRNA SPEC QL NAA+PROBE: NEGATIVE

## 2025-02-13 ENCOUNTER — TELEPHONE (OUTPATIENT)
Dept: OBSTETRICS AND GYNECOLOGY | Facility: CLINIC | Age: 25
End: 2025-02-13

## 2025-02-13 NOTE — TELEPHONE ENCOUNTER
Caller: Delphine Hernandez    Relationship: Self    Best call back number: [phone]    What was the call regarding: PT WOULD LIKE TO KNOW WHEN SHE CAN RESTART LASER HAIR REMOVAL AFTER HAVING HER LABIAL BIOPSY.

## 2025-02-26 ENCOUNTER — OFFICE VISIT (OUTPATIENT)
Dept: OBSTETRICS AND GYNECOLOGY | Facility: CLINIC | Age: 25
End: 2025-02-26
Payer: COMMERCIAL

## 2025-02-26 VITALS — DIASTOLIC BLOOD PRESSURE: 60 MMHG | SYSTOLIC BLOOD PRESSURE: 102 MMHG | BODY MASS INDEX: 20.55 KG/M2 | HEIGHT: 70 IN

## 2025-02-26 DIAGNOSIS — N90.89 VULVAR IRRITATION: Primary | ICD-10-CM

## 2025-02-26 DIAGNOSIS — Z13.89 SCREENING FOR GENITOURINARY CONDITION: ICD-10-CM

## 2025-02-26 DIAGNOSIS — N89.8 VAGINAL DISCHARGE: ICD-10-CM

## 2025-02-26 NOTE — PROGRESS NOTES
"Chief Complaint  Follow-up    Subjective        Delphine Hernandez presents to [facility]  For vulvar irritation follow-up.  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  Was not able to make her appointment with dermatology but will work on getting back on the schedule.  Has been using vaginal estrogen a couple times per week and notes good response usually the day after she applies this. Biopsy sites healing well. Having some thick discharge still. No fevers or chills.    Objective   Vital Signs:  /60   Ht 177.8 cm (70\")   BMI 20.55 kg/m²   Estimated body mass index is 20.55 kg/m² as calculated from the following:    Height as of this encounter: 177.8 cm (70\").    Weight as of 2/17/25: 65 kg (143 lb 3.2 oz).    BMI is within normal parameters. No other follow-up for BMI required.      Physical Exam  Constitutional:       General: Delphine is not in acute distress.     Appearance: Normal appearance. Delphine is not ill-appearing.   Eyes:      Pupils: Pupils are equal, round, and reactive to light.   Cardiovascular:      Rate and Rhythm: Normal rate.   Pulmonary:      Effort: Pulmonary effort is normal. No respiratory distress.   Abdominal:      General: Abdomen is flat. There is no distension.   Genitourinary:     Comments: Externally, the previous biopsy sites are healing well and show no sign of infection or separation. Slight erythema of the vulva but no excoriations or lesions.  Neurological:      General: No focal deficit present.      Mental Status: Delphine is alert.   Psychiatric:         Mood and Affect: Mood normal.         Thought Content: Thought content normal.        Result Review :                Assessment and Plan   Diagnoses and all orders for this visit:    1. Vulvar irritation (Primary)    2. Screening for genitourinary condition  -     Cancel: POC Urinalysis Dipstick  -     Cancel: POC Pregnancy, Urine    3. Vaginal discharge  -     NuSwab BV & " Lee Ann - Swab, Vagina  -     Genital Mycoplasmas YOANDY, Swab - Swab, Vagina      Swab sent for discharge. Will reach out to derm to get patient back on their schedule for evaluation. Continue vaginal estrogen as this has provided symptom relief in the interim.       Follow Up   Return in about 6 months (around 8/26/2025) for Annual.  Patient was given instructions and counseling regarding Delphine's condition or for health maintenance advice. Please see specific information pulled into the AVS if appropriate.     Cisco Moore MD  2/26/2025   12:36 EST

## 2025-03-01 LAB
A VAGINAE DNA VAG QL NAA+PROBE: ABNORMAL SCORE
BVAB2 DNA VAG QL NAA+PROBE: ABNORMAL SCORE
C ALBICANS DNA VAG QL NAA+PROBE: POSITIVE
C GLABRATA DNA VAG QL NAA+PROBE: NEGATIVE
M GENITALIUM DNA SPEC QL NAA+PROBE: NEGATIVE
M HOMINIS DNA SPEC QL NAA+PROBE: NEGATIVE
MEGA1 DNA VAG QL NAA+PROBE: ABNORMAL SCORE
UREAPLASMA DNA SPEC QL NAA+PROBE: POSITIVE

## 2025-03-04 ENCOUNTER — TELEPHONE (OUTPATIENT)
Dept: OBSTETRICS AND GYNECOLOGY | Facility: CLINIC | Age: 25
End: 2025-03-04

## 2025-03-04 NOTE — TELEPHONE ENCOUNTER
Caller: Delphine Hernandez    Relationship: Self    Best call back number: [phone]    What was the call regarding: PT REQUESTING TO RESUBMIT HER DERMATOLOGY REFERRAL. THE OFFICE TOLD HER THEY HAVEN'T RECEIVED ANYTHING.

## 2025-03-13 DIAGNOSIS — N89.8 VAGINAL IRRITATION: Primary | ICD-10-CM

## 2025-03-13 DIAGNOSIS — N89.8 VAGINAL DRYNESS: ICD-10-CM

## 2025-08-06 ENCOUNTER — OFFICE VISIT (OUTPATIENT)
Dept: FAMILY MEDICINE CLINIC | Facility: CLINIC | Age: 25
End: 2025-08-06
Payer: COMMERCIAL

## 2025-08-06 VITALS
OXYGEN SATURATION: 99 % | BODY MASS INDEX: 20.55 KG/M2 | TEMPERATURE: 97.6 F | SYSTOLIC BLOOD PRESSURE: 120 MMHG | DIASTOLIC BLOOD PRESSURE: 60 MMHG | HEART RATE: 83 BPM | HEIGHT: 70 IN

## 2025-08-06 DIAGNOSIS — F41.9 ANXIETY: Primary | ICD-10-CM

## 2025-08-06 DIAGNOSIS — R00.2 HEART PALPITATIONS: ICD-10-CM

## 2025-08-06 DIAGNOSIS — R55 SYNCOPE, UNSPECIFIED SYNCOPE TYPE: ICD-10-CM

## 2025-08-06 LAB
ALBUMIN SERPL-MCNC: 4.2 G/DL (ref 3.5–5.2)
ALBUMIN/GLOB SERPL: 1.8 G/DL
ALP SERPL-CCNC: 61 U/L (ref 39–117)
ALT SERPL-CCNC: 9 U/L (ref 1–33)
AST SERPL-CCNC: 13 U/L (ref 1–32)
BASOPHILS # BLD AUTO: 0.01 10*3/MM3 (ref 0–0.2)
BASOPHILS NFR BLD AUTO: 0.2 % (ref 0–1.5)
BILIRUB SERPL-MCNC: 0.3 MG/DL (ref 0–1.2)
BUN SERPL-MCNC: 17 MG/DL (ref 6–20)
BUN/CREAT SERPL: 22.4 (ref 7–25)
CALCIUM SERPL-MCNC: 9 MG/DL (ref 8.6–10.5)
CHLORIDE SERPL-SCNC: 107 MMOL/L (ref 98–107)
CO2 SERPL-SCNC: 22.9 MMOL/L (ref 22–29)
CREAT SERPL-MCNC: 0.76 MG/DL (ref 0.57–1)
EGFRCR SERPLBLD CKD-EPI 2021: 111.7 ML/MIN/1.73
EOSINOPHIL # BLD AUTO: 0.05 10*3/MM3 (ref 0–0.4)
EOSINOPHIL NFR BLD AUTO: 0.8 % (ref 0.3–6.2)
ERYTHROCYTE [DISTWIDTH] IN BLOOD BY AUTOMATED COUNT: 12.1 % (ref 12.3–15.4)
GLOBULIN SER CALC-MCNC: 2.4 GM/DL
GLUCOSE SERPL-MCNC: 88 MG/DL (ref 65–99)
HCT VFR BLD AUTO: 40.8 % (ref 34–46.6)
HGB BLD-MCNC: 13.5 G/DL (ref 12–15.9)
IMM GRANULOCYTES # BLD AUTO: 0.02 10*3/MM3 (ref 0–0.05)
IMM GRANULOCYTES NFR BLD AUTO: 0.3 % (ref 0–0.5)
LYMPHOCYTES # BLD AUTO: 2.15 10*3/MM3 (ref 0.7–3.1)
LYMPHOCYTES NFR BLD AUTO: 33.2 % (ref 19.6–45.3)
MCH RBC QN AUTO: 30.3 PG (ref 26.6–33)
MCHC RBC AUTO-ENTMCNC: 33.1 G/DL (ref 31.5–35.7)
MCV RBC AUTO: 91.7 FL (ref 79–97)
MONOCYTES # BLD AUTO: 0.44 10*3/MM3 (ref 0.1–0.9)
MONOCYTES NFR BLD AUTO: 6.8 % (ref 5–12)
NEUTROPHILS # BLD AUTO: 3.81 10*3/MM3 (ref 1.7–7)
NEUTROPHILS NFR BLD AUTO: 58.7 % (ref 42.7–76)
NRBC BLD AUTO-RTO: 0 /100 WBC (ref 0–0.2)
PLATELET # BLD AUTO: 204 10*3/MM3 (ref 140–450)
POTASSIUM SERPL-SCNC: 4.5 MMOL/L (ref 3.5–5.2)
PROT SERPL-MCNC: 6.6 G/DL (ref 6–8.5)
RBC # BLD AUTO: 4.45 10*6/MM3 (ref 3.77–5.28)
SODIUM SERPL-SCNC: 139 MMOL/L (ref 136–145)
TSH SERPL DL<=0.005 MIU/L-ACNC: 1.01 UIU/ML (ref 0.27–4.2)
WBC # BLD AUTO: 6.48 10*3/MM3 (ref 3.4–10.8)

## 2025-08-06 PROCEDURE — 93000 ELECTROCARDIOGRAM COMPLETE: CPT | Performed by: FAMILY MEDICINE

## 2025-08-06 PROCEDURE — 99214 OFFICE O/P EST MOD 30 MIN: CPT | Performed by: FAMILY MEDICINE

## (undated) DEVICE — ADHS SKIN PREMIERPRO EXOFIN TOPICAL HI/VISC .5ML

## (undated) DEVICE — GOWN,PREVENTION PLUS,XLNG/XXLARGE,STRL: Brand: MEDLINE

## (undated) DEVICE — SUT MNCRYL 4/0 SH 27IN Y415H

## (undated) DEVICE — DECANTER: Brand: UNBRANDED

## (undated) DEVICE — ENDOCUT SCISSOR TIP, DISPOSABLE: Brand: RENEW

## (undated) DEVICE — LAPAROSCOPIC SCOPE WARMER: Brand: DEROYAL

## (undated) DEVICE — LAG GYN LAPAROSCOPY: Brand: MEDLINE INDUSTRIES, INC.

## (undated) DEVICE — NDL HYPO PRECISIONGLIDE REG 25G 1 1/2

## (undated) DEVICE — APPL CHLORAPREP HI/LITE 26ML ORNG

## (undated) DEVICE — SYR LL TP 10ML STRL

## (undated) DEVICE — GLV SURG SENSICARE W/ALOE PF LF 7.5 STRL

## (undated) DEVICE — CONTAINER,SPECIMEN,OR STERILE,4OZ: Brand: MEDLINE

## (undated) DEVICE — ENDOPATH XCEL BLADELESS TROCARS WITH STABILITY SLEEVES: Brand: ENDOPATH XCEL

## (undated) DEVICE — PATIENT RETURN ELECTRODE, SINGLE-USE, CONTACT QUALITY MONITORING, ADULT, WITH 9FT CORD, FOR PATIENTS WEIGING OVER 33LBS. (15KG): Brand: MEGADYNE

## (undated) DEVICE — LAPAROSCOPIC SMOKE FILTRATION SYSTEM: Brand: PALL LAPAROSHIELD® PLUS LAPAROSCOPIC SMOKE FILTRATION SYSTEM

## (undated) DEVICE — TBG INSUFL W FLTR STRL